# Patient Record
Sex: FEMALE | Race: BLACK OR AFRICAN AMERICAN | NOT HISPANIC OR LATINO | URBAN - METROPOLITAN AREA
[De-identification: names, ages, dates, MRNs, and addresses within clinical notes are randomized per-mention and may not be internally consistent; named-entity substitution may affect disease eponyms.]

---

## 2023-02-02 PROBLEM — E83.42 HYPOMAGNESEMIA: Status: ACTIVE | Noted: 2023-02-02

## 2023-02-02 PROBLEM — I35.0 MILD AORTIC STENOSIS: Status: ACTIVE | Noted: 2023-02-02

## 2023-02-02 PROBLEM — R92.8 MAMMOGRAM ABNORMAL: Status: ACTIVE | Noted: 2023-02-02

## 2023-02-02 PROBLEM — E55.9 VITAMIN D DEFICIENCY: Status: ACTIVE | Noted: 2023-02-02

## 2023-02-02 PROBLEM — G31.84 MCI (MILD COGNITIVE IMPAIRMENT): Status: ACTIVE | Noted: 2023-02-02

## 2023-02-02 PROBLEM — N95.8 OTHER SPECIFIED MENOPAUSAL AND PERIMENOPAUSAL DISORDERS: Status: ACTIVE | Noted: 2023-02-02

## 2023-02-02 PROBLEM — R53.1 WEAKNESS GENERALIZED: Status: ACTIVE | Noted: 2023-02-02

## 2023-02-02 PROBLEM — K21.9 GERD (GASTROESOPHAGEAL REFLUX DISEASE): Status: ACTIVE | Noted: 2023-02-02

## 2023-02-02 PROBLEM — R80.9 MICROALBUMINURIA: Status: ACTIVE | Noted: 2023-02-02

## 2023-02-02 PROBLEM — R09.89 BRUIT OF RIGHT CAROTID ARTERY: Status: ACTIVE | Noted: 2023-02-02

## 2023-02-02 PROBLEM — R79.89 LOW VITAMIN D LEVEL: Status: ACTIVE | Noted: 2023-02-02

## 2023-02-02 PROBLEM — M17.9 OSTEOARTHRITIS OF KNEE: Status: ACTIVE | Noted: 2023-02-02

## 2023-02-02 PROBLEM — E11.9 DIABETES MELLITUS, TYPE 2 (MULTI): Status: ACTIVE | Noted: 2023-02-02

## 2023-02-02 PROBLEM — F41.9 ANXIETY: Status: ACTIVE | Noted: 2023-02-02

## 2023-02-02 PROBLEM — I10 BENIGN ESSENTIAL HYPERTENSION: Status: ACTIVE | Noted: 2023-02-02

## 2023-02-02 PROBLEM — D24.9 BREAST FIBROADENOMA: Status: ACTIVE | Noted: 2023-02-02

## 2023-02-02 PROBLEM — M16.9 OSTEOARTHRITIS OF HIP: Status: ACTIVE | Noted: 2023-02-02

## 2023-02-02 PROBLEM — R30.0 DYSURIA: Status: ACTIVE | Noted: 2023-02-02

## 2023-02-02 PROBLEM — R35.0 FREQUENCY OF URINATION: Status: ACTIVE | Noted: 2023-02-02

## 2023-02-02 PROBLEM — M25.50 JOINT PAIN: Status: ACTIVE | Noted: 2023-02-02

## 2023-02-02 PROBLEM — E78.1 FAMILIAL HYPERTRIGLYCERIDEMIA: Status: ACTIVE | Noted: 2023-02-02

## 2023-02-02 RX ORDER — LANCETS
EACH MISCELLANEOUS DAILY
COMMUNITY
Start: 2017-04-28 | End: 2023-10-06 | Stop reason: ENTERED-IN-ERROR

## 2023-02-02 RX ORDER — ATORVASTATIN CALCIUM 20 MG/1
1 TABLET, FILM COATED ORAL NIGHTLY
COMMUNITY
Start: 2014-04-02 | End: 2023-05-22

## 2023-02-02 RX ORDER — FLUTICASONE PROPIONATE 50 MCG
1 SPRAY, SUSPENSION (ML) NASAL 2 TIMES DAILY
COMMUNITY
Start: 2021-02-01 | End: 2023-10-06 | Stop reason: ENTERED-IN-ERROR

## 2023-02-02 RX ORDER — HYDROCHLOROTHIAZIDE 25 MG/1
1 TABLET ORAL DAILY
COMMUNITY
Start: 2016-07-19 | End: 2023-03-16 | Stop reason: SINTOL

## 2023-02-02 RX ORDER — VALSARTAN 320 MG/1
1 TABLET ORAL DAILY
COMMUNITY
Start: 2022-01-18 | End: 2023-03-16 | Stop reason: ALTCHOICE

## 2023-02-02 RX ORDER — LORAZEPAM 1 MG/1
1 TABLET ORAL EVERY 12 HOURS PRN
COMMUNITY
End: 2023-03-16 | Stop reason: SDUPTHER

## 2023-02-02 RX ORDER — ESCITALOPRAM OXALATE 20 MG/1
20 TABLET ORAL DAILY
COMMUNITY
End: 2023-04-10

## 2023-02-02 RX ORDER — METFORMIN HYDROCHLORIDE 850 MG/1
1 TABLET ORAL
COMMUNITY
Start: 2014-09-09 | End: 2023-03-16 | Stop reason: DRUGHIGH

## 2023-02-02 RX ORDER — NIFEDIPINE 30 MG/1
1 TABLET, FILM COATED, EXTENDED RELEASE ORAL DAILY
COMMUNITY
Start: 2020-06-18 | End: 2023-04-14 | Stop reason: SDUPTHER

## 2023-02-02 RX ORDER — HYDROXYZINE HYDROCHLORIDE 25 MG/1
1 TABLET, FILM COATED ORAL NIGHTLY PRN
COMMUNITY
End: 2023-03-16 | Stop reason: ALTCHOICE

## 2023-02-02 RX ORDER — METOPROLOL SUCCINATE 100 MG/1
1 TABLET, EXTENDED RELEASE ORAL DAILY
COMMUNITY
Start: 2014-04-08 | End: 2023-05-21

## 2023-02-23 LAB
6-ACETYLMORPHINE: NORMAL
7-AMINOCLONAZEPAM: NORMAL
ALBUMIN (G/DL) IN SER/PLAS: 4.4 G/DL (ref 3.4–5)
ALPHA-HYDROXYALPRAZOLAM: NORMAL
ALPHA-HYDROXYMIDAZOLAM: NORMAL
ALPRAZOLAM: NORMAL
AMPHETAMINE (PRESENCE) IN URINE BY SCREEN METHOD: NORMAL
ANION GAP IN SER/PLAS: 17 MMOL/L (ref 10–20)
BARBITURATES PRESENCE IN URINE BY SCREEN METHOD: NORMAL
CALCIUM (MG/DL) IN SER/PLAS: 9.7 MG/DL (ref 8.6–10.6)
CANNABINOIDS IN URINE BY SCREEN METHOD: NORMAL
CARBON DIOXIDE, TOTAL (MMOL/L) IN SER/PLAS: 26 MMOL/L (ref 21–32)
CHLORDIAZEPOXIDE: NORMAL
CHLORIDE (MMOL/L) IN SER/PLAS: 99 MMOL/L (ref 98–107)
CLONAZEPAM: NORMAL
COCAINE (PRESENCE) IN URINE BY SCREEN METHOD: NORMAL
CODEINE: NORMAL
CREATINE, URINE FOR DRUG: NORMAL
CREATININE (MG/DL) IN SER/PLAS: 2.15 MG/DL (ref 0.5–1.05)
DIAZEPAM: NORMAL
DRUG SCREEN COMMENT URINE: NORMAL
EDDP: NORMAL
ERYTHROCYTE DISTRIBUTION WIDTH (RATIO) BY AUTOMATED COUNT: 16.3 % (ref 11.5–14.5)
ERYTHROCYTE MEAN CORPUSCULAR HEMOGLOBIN CONCENTRATION (G/DL) BY AUTOMATED: 31.2 G/DL (ref 32–36)
ERYTHROCYTE MEAN CORPUSCULAR VOLUME (FL) BY AUTOMATED COUNT: 84 FL (ref 80–100)
ERYTHROCYTES (10*6/UL) IN BLOOD BY AUTOMATED COUNT: 4.02 X10E12/L (ref 4–5.2)
FENTANYL CONFIRMATION, URINE: NORMAL
GFR FEMALE: 23 ML/MIN/1.73M2
GLUCOSE (MG/DL) IN SER/PLAS: 126 MG/DL (ref 74–99)
HEMATOCRIT (%) IN BLOOD BY AUTOMATED COUNT: 33.7 % (ref 36–46)
HEMOGLOBIN (G/DL) IN BLOOD: 10.5 G/DL (ref 12–16)
HYDROCODONE: NORMAL
HYDROMORPHONE: NORMAL
LEUKOCYTES (10*3/UL) IN BLOOD BY AUTOMATED COUNT: 6.7 X10E9/L (ref 4.4–11.3)
LORAZEPAM: NORMAL
METHADONE CONFIRMATION,URINE: NORMAL
MIDAZOLAM: NORMAL
MORPHINE URINE: NORMAL
N-DESMETHYLTRAMADOL-DATA CONVERSION: NORMAL
NORDIAZEPAM: NORMAL
NORFENTANYL: NORMAL
NORHYDROCODONE: NORMAL
NOROXYCODONE: NORMAL
NOROXYMORPHONE URINE: NORMAL
NRBC (PER 100 WBCS) BY AUTOMATED COUNT: 0 /100 WBC (ref 0–0)
O-DESMETHYLTRAMADOL: NORMAL
OXAZEPAM: NORMAL
OXYCODONE: NORMAL
OXYMORPHONE: NORMAL
PHENCYCLIDINE (PRESENCE) IN URINE BY SCREEN METHOD: NORMAL
PHOSPHATE (MG/DL) IN SER/PLAS: 4.7 MG/DL (ref 2.5–4.9)
PLATELETS (10*3/UL) IN BLOOD AUTOMATED COUNT: 207 X10E9/L (ref 150–450)
POTASSIUM (MMOL/L) IN SER/PLAS: 4.2 MMOL/L (ref 3.5–5.3)
PROTEIN TOTAL: 7.3 G/DL (ref 6.4–8.2)
SODIUM (MMOL/L) IN SER/PLAS: 138 MMOL/L (ref 136–145)
TEMAZEPAM: NORMAL
TRAMADOL: NORMAL
UREA NITROGEN (MG/DL) IN SER/PLAS: 37 MG/DL (ref 6–23)
ZOLPIDEM METABOLITE (ZCA): NORMAL
ZOLPIDEM: NORMAL

## 2023-02-24 LAB
IMMUNOGLOBULIN LIGHT CHAINS KAPPA/LAMBDA (MASS RATIO) IN SERUM: 0.03 (ref 0.26–1.65)
IMMUNOGLOBULIN LIGHT CHAINS.KAPPA (MG/DL) IN SERUM: 5.13 MG/DL (ref 0.33–1.94)
IMMUNOGLOBULIN LIGHT CHAINS.LAMBDA (MG/DL) IN SERUM: 182.79 MG/DL (ref 0.57–2.63)

## 2023-03-01 LAB
6-ACETYLMORPHINE: <25 NG/ML
7-AMINOCLONAZEPAM: <25 NG/ML
ALBUMIN ELP: 4 G/DL (ref 3.4–5)
ALPHA 1: 0.3 G/DL (ref 0.2–0.6)
ALPHA 2: 1 G/DL (ref 0.4–1.1)
ALPHA-HYDROXYALPRAZOLAM: <25 NG/ML
ALPHA-HYDROXYMIDAZOLAM: <25 NG/ML
ALPRAZOLAM: <25 NG/ML
AMPHETAMINE (PRESENCE) IN URINE BY SCREEN METHOD: ABNORMAL
BARBITURATES PRESENCE IN URINE BY SCREEN METHOD: ABNORMAL
BETA: 0.8 G/DL (ref 0.5–1.2)
CANNABINOIDS IN URINE BY SCREEN METHOD: ABNORMAL
CHLORDIAZEPOXIDE: <25 NG/ML
CLONAZEPAM: <25 NG/ML
COCAINE (PRESENCE) IN URINE BY SCREEN METHOD: ABNORMAL
CODEINE: <50 NG/ML
CREATINE, URINE FOR DRUG: 378.9 MG/DL
DIAZEPAM: <25 NG/ML
DRUG SCREEN COMMENT URINE: ABNORMAL
EDDP: <25 NG/ML
FENTANYL CONFIRMATION, URINE: <2.5 NG/ML
GAMMA GLOBULIN: 1.2 G/DL (ref 0.5–1.4)
HYDROCODONE: <25 NG/ML
HYDROMORPHONE: <25 NG/ML
LORAZEPAM: 134 NG/ML
M-PROTEIN 1: 0.1 G/DL
M-PROTEIN 2: 0.1 G/DL
M-PROTEIN 3: 0.7 G/DL
METHADONE CONFIRMATION,URINE: <25 NG/ML
MIDAZOLAM: <25 NG/ML
MORPHINE URINE: <50 NG/ML
NORDIAZEPAM: <25 NG/ML
NORFENTANYL: <2.5 NG/ML
NORHYDROCODONE: <25 NG/ML
NOROXYCODONE: <25 NG/ML
O-DESMETHYLTRAMADOL: <50 NG/ML
OXAZEPAM: <25 NG/ML
OXYCODONE: <25 NG/ML
OXYMORPHONE: <25 NG/ML
PATH REVIEW - SERUM IMMUNOFIXATION: NORMAL
PATH REVIEW-SERUM PROTEIN ELECTROPHORESIS: NORMAL
PHENCYCLIDINE (PRESENCE) IN URINE BY SCREEN METHOD: ABNORMAL
PROTEIN ELECTROPHORESIS INTERPRETATION: ABNORMAL
PROTEIN TOTAL: 7.3 G/DL (ref 6.4–8.2)
SERUM IMMUNOFIXATION INTERPRETATION: ABNORMAL
TEMAZEPAM: <25 NG/ML
TRAMADOL: <50 NG/ML
ZOLPIDEM METABOLITE (ZCA): <25 NG/ML
ZOLPIDEM: <25 NG/ML

## 2023-03-15 NOTE — PROGRESS NOTES
Subjective   Patient ID: Rolando Mays is a 82 y.o. female who presents for Follow-up.    HPI   OARRS:  Mena Menjivar MD on 3/16/2023 12:06 PM  I have personally reviewed the OARRS report for Rolando Mays. I have considered the risks of abuse, dependence, addiction and diversion    Is the patient prescribed a combination of a benzodiazepine and opioid?  No    Last Urine Drug Screen / ordered today: No  Recent Results (from the past 49102 hour(s))   OPIATE/OPIOID/BENZO PRESCRIPTION COMPLIANCE    Collection Time: 02/24/23  1:14 PM   Result Value Ref Range    DRUG SCREEN COMMENT URINE SEE BELOW     Creatine, Urine 378.9 mg/dL    Amphetamine Screen, Urine PRESUMPTIVE NEGATIVE NEGATIVE    Barbiturate Screen, Urine PRESUMPTIVE NEGATIVE NEGATIVE    Cannabinoid Screen, Urine PRESUMPTIVE NEGATIVE NEGATIVE    Cocaine Screen, Urine PRESUMPTIVE NEGATIVE NEGATIVE    PCP Screen, Urine PRESUMPTIVE NEGATIVE NEGATIVE    7-Aminoclonazepam <25 Cutoff <25 ng/mL    Alpha-Hydroxyalprazolam <25 Cutoff <25 ng/mL    Alpha-Hydroxymidazolam <25 Cutoff <25 ng/mL    Alprazolam <25 Cutoff <25 ng/mL    Chlordiazepoxide <25 Cutoff <25 ng/mL    Clonazepam <25 Cutoff <25 ng/mL    Diazepam <25 Cutoff <25 ng/mL    Lorazepam 134 (A) Cutoff <25 ng/mL    Midazolam <25 Cutoff <25 ng/mL    Nordiazepam <25 Cutoff <25 ng/mL    Oxazepam <25 Cutoff <25 ng/mL    Temazepam <25 Cutoff <25 ng/mL    Zolpidem <25 Cutoff <25 ng/mL    Zolpidem Metabolite (ZCA) <25 Cutoff <25 ng/mL    6-Acetylmorphine <25 Cutoff <25 ng/mL    Codeine <50 Cutoff <50 ng/mL    Hydrocodone <25 Cutoff <25 ng/mL    Hydromorphone <25 Cutoff <25 ng/mL    Morphine Urine <50 Cutoff <50 ng/mL    Norhydrocodone <25 Cutoff <25 ng/mL    Noroxycodone <25 Cutoff <25 ng/mL    Oxycodone <25 Cutoff <25 ng/mL    Oxymorphone <25 Cutoff <25 ng/mL    Tramadol <50 Cutoff <50 ng/mL    O-Desmethyltramadol <50 Cutoff <50 ng/mL    Fentanyl <2.5 Cutoff<2.5 ng/mL    Norfentanyl <2.5 Cutoff<2.5 ng/mL     METHADONE CONFIRMATION,URINE <25 Cutoff <25 ng/mL    EDDP <25 Cutoff <25 ng/mL     Results are as expected.     Controlled Substance Agreement:  Date of the Last Agreement: 03/16/2023  Reviewed Controlled Substance Agreement including but not limited to the benefits, risks, and alternatives to treatment with a Controlled Substance medication(s).    Benzodiazepines:  What is the patient's goal of therapy? Better control of anxiety  Is this being achieved with current treatment? es    AILYN-7:  No data recorded    Activities of Daily Living:   Is your overall impression that this patient is benefiting (symptom reduction outweighs side effects) from benzodiazepine therapy? Yes     1. Physical Functioning: Better  2. Family Relationship: Better  3. Social Relationship: Better  4. Mood: Better  5. Sleep Patterns: Better  6. Overall Function: Better    The ativan has been working out well for her anxiety and she reports that she has been feeling better at home. The patient had a biopsy done 1 day ago. She is also taking nifedipine, metoprolol for her hypertension and Lorazepam in addition to the lexapro for her anxiety. She reports no side effects from these medications.  We stopped er hydrochlorothiazide and Valsartan and BP have been stable. We stopped Metofmrin for RAMESH, sugars are in 200s.     Review of Systems  Constitutional: No fever or chills  Cardiovascular: no chest pain, no palpitations and no syncope.   Respiratory: no cough, no shortness of breath during exertion and no shortness of breath at rest.   Gastrointestinal: no abdominal pain, no nausea and no vomiting.  Neuro: No Headache, no dizziness    Objective   /61   Pulse 75   Wt 68.9 kg (152 lb)   SpO2 100%   BMI 26.09 kg/m²     Physical Exam  Constitutional: Alert and in no acute distress. Well developed, well nourished  Head and Face: Head and face: Normal.    Cardiovascular: Heart rate and rhythm were normal, normal S1 and S2. No peripheral edema.    Pulmonary: No respiratory distress. Clear bilateral breath sounds.  Musculoskeletal: Gait and station: Normal. Muscle strength/tone: Normal.   Skin: Normal skin color and pigmentation, normal skin turgor, and no rash.    Psychiatric: Judgment and insight: Intact. Mood and affect: Normal.    Lab Results   Component Value Date    WBC 6.7 03/15/2023    HGB 9.5 (L) 03/15/2023    HCT 30.2 (L) 03/15/2023     03/15/2023    CHOL 182 03/31/2022    TRIG 106 03/31/2022    HDL 62.1 03/31/2022    ALT 9 03/15/2023    AST 11 03/15/2023     03/15/2023    K 3.3 (L) 03/15/2023     03/15/2023    CREATININE 0.95 03/15/2023    BUN 22 03/15/2023    CO2 27 03/15/2023    TSH 0.47 03/31/2022    HGBA1C 6.2 (A) 11/30/2022       CT chest abdomen pelvis wo IV contrast  Narrative: Interpreted By:  JANAY OLIVA MD  MRN: [id number]  Patient Name: CORTES GRIJALVA     STUDY:  CT CHEST ABDOMEN PELVIS WO CONTRAST;  3/6/2023 1:14 pm     INDICATION:  r/o malignancy, anemia, wieght loss, and confusion  R63.4:  Unexplained weight loss.     COMPARISON:  CT abdomen 02/08/2016, CT chest 11/11/2015.     ACCESSION NUMBER(S):  [id number]     ORDERING CLINICIAN:  ERNA BORREGO     TECHNIQUE:  CT of the chest, abdomen and pelvis was performed. Contiguous axial  images were obtained at 3 mm slice thickness through the chest,  abdomen and pelvis. Coronal and sagittal reconstructions at 3 mm  slice thickness were performed.  No intravenous contrast was  administered; positive oral contrast was given.     FINDINGS:  Please note that the study is limited without intravenous contrast.     CHEST:  Examination is mildly degraded by motion.     LUNG/PLEURA/LARGE AIRWAYS:  Central airways are patent without endobronchial lesions. Mild  bibasilar atelectasis. No focal consolidation. No pneumothorax no  pleural effusion. No suspicious pulmonary nodule.     VESSELS:  Aorta and pulmonary arteries are normal caliber.  Mild  atherosclerotic changes are  noted of the aorta and branching vessels.  Mild coronary artery calcifications are present.     HEART:  The heart is normal in size.  No pericardial effusion     MEDIASTINUM AND LUNA:  No mediastinal, hilar or axillary lymph nodes are present.  There is  wall thickening of the esophagus.     CHEST WALL, BONES AND LOWER NECK:  Chest wall is unremarkable. Mild degenerative changes of the thoracic  spine. Partially imaged severe degenerative changes left shoulder. 9  mm right thyroid lobe low-attenuation lesion and partially imaged  low-attenuation lesion left thyroid lobe punctate calcification.     ABDOMEN:     LIVER:  The liver is normal in size without evidence of focal liver lesions.     BILE DUCTS:  The bile ducts are not dilated.     GALLBLADDER:  The gallbladder is not distended and with multiple layering calcified  stones.     PANCREAS:  The pancreas appears unremarkable.     SPLEEN:  Within normal limits.     ADRENAL GLANDS:  Bilateral adrenal glands appear normal.     KIDNEYS AND URETERS:  The kidneys have normal size and enhance symmetrically. 8.0 x 6.5 cm  right upper pole simple cyst, not significantly changed in size since  2016. Additional 7 mm left interpolar region hyperdense cyst (series  3, image 83), mildly increased in size since 2016. No  hydroureteronephrosis or nephroureterolithiasis is present.     PELVIS:     BLADDER:  The urinary bladder appears within normal limits.     REPRODUCTIVE ORGANS:  Densely calcified and enlarged uterine fibroid.     BOWEL:  The stomach is unremarkable.  No bowel dilation or wall thickening.  Extensive colonic stool with fecal impaction. Diverticulosis without  evidence of diverticulitis. The appendix appears normal.     VESSELS:  There is no aneurysmal dilatation of the abdominal aorta. The IVC is  within normal limits. Severe atherosclerotic calcification abdominal  aorta and iliac arteries.     PERITONEUM/RETROPERITONEUM/LYMPH NODES:  There is no free or  loculated fluid collection, no free  intraperitoneal air.  The retroperitoneum appears unremarkable.  No  abdominopelvic lymphadenopathy is present.     ABDOMINAL WALL:  The abdominal wall soft tissues appear normal.     BONES:  No suspicious osseous lesions are present. Degenerative discogenic  disease is noted in the lower thoracic and lumbar spine. Grade 1  anterolisthesis L3 on L4 and L4 on L5. Mild levoscoliosis centered in  the upper lumbar spine.     Impression: Chest  1.  No acute findings in the thorax.  2. No suspicious pulmonary nodule or thoracic adenopathy.  3. 9 mm right thyroid lobe low-attenuation nodule.     Abdomen-Pelvis  1.  No acute findings in the abdomen and pelvis.  2. Cholelithiasis without evidence of cholecystitis.  3. Extensive colonic stool with fecal impaction.  4. Diverticulosis without diverticulitis.  5. Stable right upper pole renal simple cyst and mild interval  increased 7 mm left interpolar region hyperdense cysts, since 2016.         Assessment/Plan   Diagnoses and all orders for this visit:  Type 2 diabetes mellitus without complication, without long-term current use of insulin (CMS/[facility])  -     metFORMIN (Glucophage) 500 mg tablet; Take 1 tablet (500 mg) by mouth once daily with a meal.  -     Hemoglobin A1C; Future  -     Follow Up In [facility] - PCP; Future  Benign essential hypertension  -     Comprehensive Metabolic Panel; Future  MCI (mild cognitive impairment)  Monoclonal gammopathy of unknown significance (MGUS)        Dear Rolando Mays     It was my pleasure to take care of you today in the office. Below are the things we discussed today:    1. Hypertension: Advised patient to sop hydrochlorothiazide. Continue nifedipine and metoprolol.    2. Diabetes: Restart Metformin. If blood sugars are consistently elevated we will increase the dose.    3. Anxiety: Continue adderall and Lorazepam.    4. Advised the patient to incorporate more vegetables in her  diet.    5. Hyperlipidemia: Continue atorvastatin.    6. MGUS: The patient will follow up with hematology.     Your yearly Physical is due in: May 2023   When you call the office for your yearly Physical, please ask them to inform me to order your blood work, so that you can get the fasting blood work before your appointment and we can discuss the results at your physical.      Please call me if any questions arise from now until your next visit. I will call you after I am done seeing patients. A Doctor is always available by phone when the office is closed. Please feel free to call for help with any problem that you feel shouldn't wait until the office re-opens.     Scribe Attestation  By signing my name below, IKayla Scribe   attest that this documentation has been prepared under the direction and in the presence of Mena Menjivar MD.

## 2023-03-16 ENCOUNTER — OFFICE VISIT (OUTPATIENT)
Dept: PRIMARY CARE | Facility: CLINIC | Age: 83
End: 2023-03-16
Payer: COMMERCIAL

## 2023-03-16 VITALS
OXYGEN SATURATION: 100 % | HEART RATE: 75 BPM | SYSTOLIC BLOOD PRESSURE: 125 MMHG | BODY MASS INDEX: 26.09 KG/M2 | WEIGHT: 152 LBS | DIASTOLIC BLOOD PRESSURE: 61 MMHG

## 2023-03-16 DIAGNOSIS — G31.84 MCI (MILD COGNITIVE IMPAIRMENT): ICD-10-CM

## 2023-03-16 DIAGNOSIS — D47.2 MONOCLONAL GAMMOPATHY OF UNKNOWN SIGNIFICANCE (MGUS): ICD-10-CM

## 2023-03-16 DIAGNOSIS — E11.9 TYPE 2 DIABETES MELLITUS WITHOUT COMPLICATION, WITHOUT LONG-TERM CURRENT USE OF INSULIN (MULTI): Primary | ICD-10-CM

## 2023-03-16 DIAGNOSIS — I10 BENIGN ESSENTIAL HYPERTENSION: ICD-10-CM

## 2023-03-16 DIAGNOSIS — F41.9 ANXIETY: ICD-10-CM

## 2023-03-16 PROBLEM — R30.0 DYSURIA: Status: RESOLVED | Noted: 2023-02-02 | Resolved: 2023-03-16

## 2023-03-16 PROCEDURE — 3078F DIAST BP <80 MM HG: CPT | Performed by: FAMILY MEDICINE

## 2023-03-16 PROCEDURE — 99214 OFFICE O/P EST MOD 30 MIN: CPT | Performed by: FAMILY MEDICINE

## 2023-03-16 PROCEDURE — 1160F RVW MEDS BY RX/DR IN RCRD: CPT | Performed by: FAMILY MEDICINE

## 2023-03-16 PROCEDURE — 1159F MED LIST DOCD IN RCRD: CPT | Performed by: FAMILY MEDICINE

## 2023-03-16 PROCEDURE — 3074F SYST BP LT 130 MM HG: CPT | Performed by: FAMILY MEDICINE

## 2023-03-16 PROCEDURE — 1036F TOBACCO NON-USER: CPT | Performed by: FAMILY MEDICINE

## 2023-03-16 RX ORDER — LORAZEPAM 1 MG/1
0.5 TABLET ORAL 2 TIMES DAILY PRN
Qty: 60 TABLET | Refills: 1 | Status: SHIPPED | OUTPATIENT
Start: 2023-03-16 | End: 2023-06-19 | Stop reason: SDUPTHER

## 2023-03-16 RX ORDER — METFORMIN HYDROCHLORIDE 500 MG/1
500 TABLET ORAL
Qty: 90 TABLET | Refills: 0 | Status: SHIPPED | OUTPATIENT
Start: 2023-03-16 | End: 2023-06-12

## 2023-03-16 ASSESSMENT — ENCOUNTER SYMPTOMS
LOSS OF SENSATION IN FEET: 0
DEPRESSION: 0
OCCASIONAL FEELINGS OF UNSTEADINESS: 1

## 2023-03-16 ASSESSMENT — PATIENT HEALTH QUESTIONNAIRE - PHQ9
1. LITTLE INTEREST OR PLEASURE IN DOING THINGS: NOT AT ALL
2. FEELING DOWN, DEPRESSED OR HOPELESS: NOT AT ALL
SUM OF ALL RESPONSES TO PHQ9 QUESTIONS 1 AND 2: 0

## 2023-04-09 DIAGNOSIS — F41.9 ANXIETY DISORDER, UNSPECIFIED: ICD-10-CM

## 2023-04-10 RX ORDER — ESCITALOPRAM OXALATE 20 MG/1
TABLET ORAL
Qty: 90 TABLET | Refills: 0 | Status: SHIPPED | OUTPATIENT
Start: 2023-04-10 | End: 2023-07-01

## 2023-04-14 ENCOUNTER — TELEPHONE (OUTPATIENT)
Dept: PRIMARY CARE | Facility: CLINIC | Age: 83
End: 2023-04-14
Payer: COMMERCIAL

## 2023-04-14 DIAGNOSIS — I10 BENIGN ESSENTIAL HYPERTENSION: Primary | ICD-10-CM

## 2023-04-14 LAB
ALANINE AMINOTRANSFERASE (SGPT) (U/L) IN SER/PLAS: 10 U/L (ref 7–45)
ALBUMIN (G/DL) IN SER/PLAS: 3.8 G/DL (ref 3.4–5)
ALKALINE PHOSPHATASE (U/L) IN SER/PLAS: 68 U/L (ref 33–136)
ANION GAP IN SER/PLAS: 11 MMOL/L (ref 10–20)
ASPARTATE AMINOTRANSFERASE (SGOT) (U/L) IN SER/PLAS: 14 U/L (ref 9–39)
BASOPHILS (10*3/UL) IN BLOOD BY AUTOMATED COUNT: 0.02 X10E9/L (ref 0–0.1)
BASOPHILS/100 LEUKOCYTES IN BLOOD BY AUTOMATED COUNT: 0.4 % (ref 0–2)
BILIRUBIN TOTAL (MG/DL) IN SER/PLAS: 1.7 MG/DL (ref 0–1.2)
CALCIUM (MG/DL) IN SER/PLAS: 9 MG/DL (ref 8.6–10.6)
CARBON DIOXIDE, TOTAL (MMOL/L) IN SER/PLAS: 29 MMOL/L (ref 21–32)
CHLORIDE (MMOL/L) IN SER/PLAS: 105 MMOL/L (ref 98–107)
CREATININE (MG/DL) IN SER/PLAS: 0.78 MG/DL (ref 0.5–1.05)
EOSINOPHILS (10*3/UL) IN BLOOD BY AUTOMATED COUNT: 0.09 X10E9/L (ref 0–0.4)
EOSINOPHILS/100 LEUKOCYTES IN BLOOD BY AUTOMATED COUNT: 1.9 % (ref 0–6)
ERYTHROCYTE DISTRIBUTION WIDTH (RATIO) BY AUTOMATED COUNT: 17.2 % (ref 11.5–14.5)
ERYTHROCYTE MEAN CORPUSCULAR HEMOGLOBIN CONCENTRATION (G/DL) BY AUTOMATED: 29.7 G/DL (ref 32–36)
ERYTHROCYTE MEAN CORPUSCULAR VOLUME (FL) BY AUTOMATED COUNT: 90 FL (ref 80–100)
ERYTHROCYTES (10*6/UL) IN BLOOD BY AUTOMATED COUNT: 3.22 X10E12/L (ref 4–5.2)
GFR FEMALE: 76 ML/MIN/1.73M2
GLUCOSE (MG/DL) IN SER/PLAS: 116 MG/DL (ref 74–99)
HEMATOCRIT (%) IN BLOOD BY AUTOMATED COUNT: 29 % (ref 36–46)
HEMOGLOBIN (G/DL) IN BLOOD: 8.6 G/DL (ref 12–16)
IMMATURE GRANULOCYTES/100 LEUKOCYTES IN BLOOD BY AUTOMATED COUNT: 1 % (ref 0–0.9)
LEUKOCYTES (10*3/UL) IN BLOOD BY AUTOMATED COUNT: 4.8 X10E9/L (ref 4.4–11.3)
LYMPHOCYTES (10*3/UL) IN BLOOD BY AUTOMATED COUNT: 1.49 X10E9/L (ref 0.8–3)
LYMPHOCYTES/100 LEUKOCYTES IN BLOOD BY AUTOMATED COUNT: 31 % (ref 13–44)
MONOCYTES (10*3/UL) IN BLOOD BY AUTOMATED COUNT: 0.37 X10E9/L (ref 0.05–0.8)
MONOCYTES/100 LEUKOCYTES IN BLOOD BY AUTOMATED COUNT: 7.7 % (ref 2–10)
NEUTROPHILS (10*3/UL) IN BLOOD BY AUTOMATED COUNT: 2.79 X10E9/L (ref 1.6–5.5)
NEUTROPHILS/100 LEUKOCYTES IN BLOOD BY AUTOMATED COUNT: 58 % (ref 40–80)
NRBC (PER 100 WBCS) BY AUTOMATED COUNT: 0.4 /100 WBC (ref 0–0)
PLATELETS (10*3/UL) IN BLOOD AUTOMATED COUNT: 206 X10E9/L (ref 150–450)
POTASSIUM (MMOL/L) IN SER/PLAS: 4 MMOL/L (ref 3.5–5.3)
PROTEIN TOTAL: 6.3 G/DL (ref 6.4–8.2)
SODIUM (MMOL/L) IN SER/PLAS: 141 MMOL/L (ref 136–145)
UREA NITROGEN (MG/DL) IN SER/PLAS: 11 MG/DL (ref 6–23)

## 2023-04-14 RX ORDER — NIFEDIPINE 30 MG/1
TABLET, FILM COATED, EXTENDED RELEASE ORAL
Qty: 180 TABLET | Refills: 0 | Status: SHIPPED | OUTPATIENT
Start: 2023-04-14 | End: 2023-07-06

## 2023-05-11 ENCOUNTER — LAB (OUTPATIENT)
Dept: LAB | Facility: LAB | Age: 83
End: 2023-05-11
Payer: COMMERCIAL

## 2023-05-11 DIAGNOSIS — I10 BENIGN ESSENTIAL HYPERTENSION: ICD-10-CM

## 2023-05-11 DIAGNOSIS — E11.9 TYPE 2 DIABETES MELLITUS WITHOUT COMPLICATION, WITHOUT LONG-TERM CURRENT USE OF INSULIN (MULTI): ICD-10-CM

## 2023-05-11 LAB
ALANINE AMINOTRANSFERASE (SGPT) (U/L) IN SER/PLAS: 12 U/L (ref 7–45)
ALBUMIN (G/DL) IN SER/PLAS: 3.8 G/DL (ref 3.4–5)
ALKALINE PHOSPHATASE (U/L) IN SER/PLAS: 76 U/L (ref 33–136)
ANION GAP IN SER/PLAS: 12 MMOL/L (ref 10–20)
ASPARTATE AMINOTRANSFERASE (SGOT) (U/L) IN SER/PLAS: 11 U/L (ref 9–39)
BILIRUBIN TOTAL (MG/DL) IN SER/PLAS: 1.2 MG/DL (ref 0–1.2)
CALCIUM (MG/DL) IN SER/PLAS: 9.4 MG/DL (ref 8.6–10.6)
CARBON DIOXIDE, TOTAL (MMOL/L) IN SER/PLAS: 30 MMOL/L (ref 21–32)
CHLORIDE (MMOL/L) IN SER/PLAS: 106 MMOL/L (ref 98–107)
CREATININE (MG/DL) IN SER/PLAS: 0.8 MG/DL (ref 0.5–1.05)
ESTIMATED AVERAGE GLUCOSE FOR HBA1C: 123 MG/DL
GFR FEMALE: 74 ML/MIN/1.73M2
GLUCOSE (MG/DL) IN SER/PLAS: 132 MG/DL (ref 74–99)
HEMOGLOBIN A1C/HEMOGLOBIN TOTAL IN BLOOD: 5.9 %
POTASSIUM (MMOL/L) IN SER/PLAS: 4.3 MMOL/L (ref 3.5–5.3)
PROTEIN TOTAL: 6.7 G/DL (ref 6.4–8.2)
SODIUM (MMOL/L) IN SER/PLAS: 144 MMOL/L (ref 136–145)
UREA NITROGEN (MG/DL) IN SER/PLAS: 19 MG/DL (ref 6–23)

## 2023-05-11 PROCEDURE — 36415 COLL VENOUS BLD VENIPUNCTURE: CPT

## 2023-05-11 PROCEDURE — 80053 COMPREHEN METABOLIC PANEL: CPT

## 2023-05-11 PROCEDURE — 83036 HEMOGLOBIN GLYCOSYLATED A1C: CPT

## 2023-05-17 ENCOUNTER — OFFICE VISIT (OUTPATIENT)
Dept: PRIMARY CARE | Facility: CLINIC | Age: 83
End: 2023-05-17
Payer: COMMERCIAL

## 2023-05-17 VITALS
HEIGHT: 64 IN | DIASTOLIC BLOOD PRESSURE: 81 MMHG | OXYGEN SATURATION: 98 % | HEART RATE: 56 BPM | BODY MASS INDEX: 26.98 KG/M2 | WEIGHT: 158 LBS | SYSTOLIC BLOOD PRESSURE: 138 MMHG

## 2023-05-17 DIAGNOSIS — I10 BENIGN ESSENTIAL HYPERTENSION: Primary | ICD-10-CM

## 2023-05-17 DIAGNOSIS — D47.2 MONOCLONAL GAMMOPATHY OF UNKNOWN SIGNIFICANCE (MGUS): ICD-10-CM

## 2023-05-17 DIAGNOSIS — G31.84 MCI (MILD COGNITIVE IMPAIRMENT): ICD-10-CM

## 2023-05-17 DIAGNOSIS — E55.9 VITAMIN D DEFICIENCY: ICD-10-CM

## 2023-05-17 DIAGNOSIS — E11.9 TYPE 2 DIABETES MELLITUS WITHOUT COMPLICATION, WITHOUT LONG-TERM CURRENT USE OF INSULIN (MULTI): ICD-10-CM

## 2023-05-17 PROCEDURE — 1036F TOBACCO NON-USER: CPT | Performed by: FAMILY MEDICINE

## 2023-05-17 PROCEDURE — 1160F RVW MEDS BY RX/DR IN RCRD: CPT | Performed by: FAMILY MEDICINE

## 2023-05-17 PROCEDURE — 1159F MED LIST DOCD IN RCRD: CPT | Performed by: FAMILY MEDICINE

## 2023-05-17 PROCEDURE — 99214 OFFICE O/P EST MOD 30 MIN: CPT | Performed by: FAMILY MEDICINE

## 2023-05-17 PROCEDURE — 3075F SYST BP GE 130 - 139MM HG: CPT | Performed by: FAMILY MEDICINE

## 2023-05-17 PROCEDURE — 3079F DIAST BP 80-89 MM HG: CPT | Performed by: FAMILY MEDICINE

## 2023-05-17 ASSESSMENT — ENCOUNTER SYMPTOMS
SHORTNESS OF BREATH: 0
OCCASIONAL FEELINGS OF UNSTEADINESS: 1
VOMITING: 0
MYALGIAS: 0
CONSTIPATION: 0
FEVER: 0
UNEXPECTED WEIGHT CHANGE: 0
DIARRHEA: 0
LOSS OF SENSATION IN FEET: 0
NAUSEA: 0
CHILLS: 0
DEPRESSION: 1

## 2023-05-17 NOTE — PROGRESS NOTES
Subjective   Patient ID: Rolando Mays is a 82 y.o. female who presents for a follow up for hypertension.    HPI   The patient states that she is taking atorvastatin for her hyperlipidemia and Metformin for her diabetes. She is taking these medications as prescribed and is tolerating it well. Her diabetes is well- controlled at this time. She adds that the ativan and Lexapro has been working well for her anxiety and depression and her confusion has not worsened or improved. The patient reports that she is following up with Hematology for her MGUS and was recently started on Dexamethasone. Additionally, she reports that she has been taking Metoprolol daily and nifedipine as needed for hypertension and her home blood pressures have been in the 130s systolic and 80s diastolic.     OARRS:  Mena Menjivar MD on 5/17/2023  2:34 PM  I have personally reviewed the OARRS report for Rolando Mays. I have considered the risks of abuse, dependence, addiction and diversion    Is the patient prescribed a combination of a benzodiazepine and opioid?  No    Last Urine Drug Screen / ordered today: No  Recent Results (from the past 97779 hour(s))   OPIATE/OPIOID/BENZO PRESCRIPTION COMPLIANCE    Collection Time: 02/24/23  1:14 PM   Result Value Ref Range    DRUG SCREEN COMMENT URINE SEE BELOW     Creatine, Urine 378.9 mg/dL    Amphetamine Screen, Urine PRESUMPTIVE NEGATIVE NEGATIVE    Barbiturate Screen, Urine PRESUMPTIVE NEGATIVE NEGATIVE    Cannabinoid Screen, Urine PRESUMPTIVE NEGATIVE NEGATIVE    Cocaine Screen, Urine PRESUMPTIVE NEGATIVE NEGATIVE    PCP Screen, Urine PRESUMPTIVE NEGATIVE NEGATIVE    7-Aminoclonazepam <25 Cutoff <25 ng/mL    Alpha-Hydroxyalprazolam <25 Cutoff <25 ng/mL    Alpha-Hydroxymidazolam <25 Cutoff <25 ng/mL    Alprazolam <25 Cutoff <25 ng/mL    Chlordiazepoxide <25 Cutoff <25 ng/mL    Clonazepam <25 Cutoff <25 ng/mL    Diazepam <25 Cutoff <25 ng/mL    Lorazepam 134 (A) Cutoff <25 ng/mL    Midazolam  "<25 Cutoff <25 ng/mL    Nordiazepam <25 Cutoff <25 ng/mL    Oxazepam <25 Cutoff <25 ng/mL    Temazepam <25 Cutoff <25 ng/mL    Zolpidem <25 Cutoff <25 ng/mL    Zolpidem Metabolite (ZCA) <25 Cutoff <25 ng/mL    6-Acetylmorphine <25 Cutoff <25 ng/mL    Codeine <50 Cutoff <50 ng/mL    Hydrocodone <25 Cutoff <25 ng/mL    Hydromorphone <25 Cutoff <25 ng/mL    Morphine Urine <50 Cutoff <50 ng/mL    Norhydrocodone <25 Cutoff <25 ng/mL    Noroxycodone <25 Cutoff <25 ng/mL    Oxycodone <25 Cutoff <25 ng/mL    Oxymorphone <25 Cutoff <25 ng/mL    Tramadol <50 Cutoff <50 ng/mL    O-Desmethyltramadol <50 Cutoff <50 ng/mL    Fentanyl <2.5 Cutoff<2.5 ng/mL    Norfentanyl <2.5 Cutoff<2.5 ng/mL    METHADONE CONFIRMATION,URINE <25 Cutoff <25 ng/mL    EDDP <25 Cutoff <25 ng/mL     Results are as expected.     Controlled Substance Agreement:  Date of the Last Agreement: 03/16/2023  Reviewed Controlled Substance Agreement including but not limited to the benefits, risks, and alternatives to treatment with a Controlled Substance medication(s).    Benzodiazepines:  What is the patient's goal of therapy? Better mood  Is this being achieved with current treatment? Yes    AILYN-7:  No data recorded    Activities of Daily Living:   Is your overall impression that this patient is benefiting (symptom reduction outweighs side effects) from benzodiazepine therapy? Yes     1. Physical Functioning: Better  2. Family Relationship: Better  3. Social Relationship: Better  4. Mood: Better  5. Sleep Patterns: Better  6. Overall Function: Better    Review of Systems  Constitutional: No fever or chills  Cardiovascular: no chest pain, no palpitations and no syncope.   Respiratory: no cough, no shortness of breath during exertion and no shortness of breath at rest.   Gastrointestinal: no abdominal pain, no nausea and no vomiting.  Neuro: No Headache, no dizziness    Objective   /81   Pulse 56   Ht 1.626 m (5' 4\")   Wt 71.7 kg (158 lb)   SpO2 98%   " BMI 27.12 kg/m²     Physical Exam  Constitutional: Alert and in no acute distress. Well developed, well nourished  Head and Face: Head and face: Normal.    Cardiovascular: Heart rate and rhythm were normal, normal S1 and S2. No peripheral edema.   Pulmonary: No respiratory distress. Clear bilateral breath sounds.  Musculoskeletal: Gait and station: Normal. Muscle strength/tone: Normal.   Skin: Normal skin color and pigmentation, normal skin turgor, and no rash.    Psychiatric: Judgment and insight: Intact. Mood and affect: Normal.    Lab Results   Component Value Date    WBC 7.5 05/04/2023    HGB 9.9 (L) 05/04/2023    HCT 31.8 (L) 05/04/2023     05/04/2023    CHOL 182 03/31/2022    TRIG 106 03/31/2022    HDL 62.1 03/31/2022    ALT 12 05/11/2023    AST 11 05/11/2023     05/11/2023    K 4.3 05/11/2023     05/11/2023    CREATININE 0.80 05/11/2023    BUN 19 05/11/2023    CO2 30 05/11/2023    TSH 0.47 03/31/2022    HGBA1C 5.9 (A) 05/11/2023       NM PET CT myeloma initial  Narrative: Interpreted By:  SALVADOR LAND MD and CARLOS MANUEL BOYER MD  MRN: [id number]  Patient Name: CORTES GRIJALVA     STUDY:  PET/CT MYELOMA INITIAL;  4/10/2023 10:00 am     INDICATION:  myeloma staging  D47.2: MGUS (monoclonal gammopathy of unknown  significance).     COMPARISON:  CT chest abdomen pelvis 03/06/2023     ACCESSION NUMBER(S):  [id number]     ORDERING CLINICIAN:  LORI CHAVIS     TECHNIQUE:  DIVISION OF NUCLEAR MEDICINE  POSITRON EMISSION TOMOGRAPHY (PET-CT)     The patient received an intravenous dose of 11.4 mCi of Fluorine-18  fluorodeoxyglucose (FDG). Positron emission tomographic (PET) images  from mid-thigh to skull base were then acquired after a one hour  delay. Also acquired was a contemporaneous low dose non-contrast CT  scan performed for attenuation correction of PET images and anatomic  localization.  The PET and CT images were digitally fused for  display.  All images were acquired on a combined  PET-CT scanner unit.  Some areas of FDG accumulation may be described in standardized  uptake value (SUV) units.     CODING:  Initial Treatment Strategy (PI)     CALIBRATION:  Dose Injection-to-Scan Interval (mins): 61 min  Mediastinal bloodpool SUV (normal 1.5-2.5): 2.9  Blood glucose: 137 mg/dL     FINDINGS:  NECK:  Nonspecific hypermetabolic foci in the thyroid isthmus (max SUV 3.9)  and the left posterior thyroid lobe (max SUV 3.4).  No hypermetabolic cervical lymphadenopathy is present.     CHEST:  No focal hypermetabolic lesion is seen in the lung parenchyma.  No evidence of hypermetabolic mediastinal, hilar or axillary  lymphadenopathy.     ABDOMEN AND PELVIS:  No abnormal hypermetabolic activity in the abdomen and pelvis.  No evidence of hypermetabolic lymphadenopathy.  Physiologic radiotracer uptake is present in the liver and spleen  with excretion into the bowel loops and the genitourinary tract.  Cholelithiasis is noted.  The uterus is enlarged with multiple  partially calcified uterine fibroids. There is an 8.0 cm photopenic  defect, compatible with a simple renal cyst arising from the right  kidney.     MUSCULOSKELETAL:  There is no focal hypermetabolic lesion to suggest osseous metastasis.  Degenerative changes are noted in the bilateral shoulders and the  left knee.     Impression: No definite evidence of an active myelomatous process.     Hypermetabolic foci in the thyroid isthmus and posterior thyroid  lobe. It should be noted that incidental foci within the thyroid may  be bowed in of the 30% of cases. Further evaluation is recommended  with dedicated thyroid ultrasound.     I personally reviewed the image(s) / study and agree with the  findings and interpretation as stated. This study was interpreted at  [facility].         Assessment/Plan   Diagnoses and all orders for this visit:  Benign essential hypertension  Type 2 diabetes mellitus without complication,  without long-term current use of insulin (CMS/[facility])  -     Follow Up In [facility] - PCP  MCI (mild cognitive impairment)  -     MR brain wo IV contrast neuroquant protocol; Future  -     Follow Up In [facility] - PCP; Future  Monoclonal gammopathy of unknown significance (MGUS)  -     MR brain wo IV contrast neuroquant protocol; Future  -     Follow Up In [facility] - PCP; Future  Vitamin D deficiency        Dear Rolando Mays     It was my pleasure to take care of you today in the office. Below are the things we discussed today:    1. Hyperlipidemia: Continue atorvastatin.    2. Diabetes: A1c 5.9. Well-controlled. Continue Metformin.    3. Hypertension: Continue metoprolol and use Nifedipine as needed.    4. Depression, anxiety and cognitive impairment: Continue Lexapro and use Ativan as needed. Brain MRI ordered.  I have personally reviewed the OARRS report for this patient. This report is scanned into the electronic medical record. I have considered the risks of abuse, dependence, addiction and diversion. I believe that it is clinically appropriate for this patient to be prescribed this medication.     Based on the above findings and the clinical response to the controlled substance medications and improvement of the activities of daily living, sleep, and work performance. We made this complex decision to continue this therapy in light of the evidence of the patient's responsibility in using these medications as prescribed for their condition.     I explained and discussed with the patient and stressed the importance of knowing the side effects and the addicting and habit forming nature of the dangerous drugs we are using to treat the symptoms.     5. MGUS: Continue following up with Hematology. Continue taking Dexamethasone.    Your yearly Physical is due in: August 2023  When you call the office for your yearly Physical, please ask them to inform me to order your blood work,  so that you can get the fasting blood work before your appointment and we can discuss the results at your physical.      Please call me if any questions arise from now until your next visit. I will call you after I am done seeing patients. A Doctor is always available by phone when the office is closed. Please feel free to call for help with any problem that you feel shouldn't wait until the office re-opens.     Scribe Attestation  By signing my name below, IKayla Scribe   attest that this documentation has been prepared under the direction and in the presence of Mena Menjivar MD.

## 2023-05-17 NOTE — PROGRESS NOTES
"Subjective   Patient ID: Rolando Mays is a 82 y.o. female who presents for 2 month followup.    Patient presents to clinic with her caregiver today. They state that the patient has been in overall good health since her last visit. Her MGUS is being managed by her hematologist, who recently prescribed Decadron for management; patient discontinued nifedipine at that time and has had stable blood pressures in the 130s/80s. She is stable on the lorazepam twice daily and her caregiver notes she is much less agitated. Patient says she is eating a balanced diet. They would like refills for most of her medications today.          Review of Systems   Constitutional:  Negative for chills, fever and unexpected weight change.   Respiratory:  Negative for shortness of breath.    Cardiovascular:  Negative for chest pain.   Gastrointestinal:  Negative for constipation, diarrhea, nausea and vomiting.   Musculoskeletal:  Negative for myalgias.       Objective   /81   Pulse 56   Ht 1.626 m (5' 4\")   Wt 71.7 kg (158 lb)   SpO2 98%   BMI 27.12 kg/m²     Physical Exam  Constitutional:       Appearance: Normal appearance.   HENT:      Head: Normocephalic and atraumatic.   Eyes:      Extraocular Movements: Extraocular movements intact.      Pupils: Pupils are equal, round, and reactive to light.   Cardiovascular:      Rate and Rhythm: Normal rate and regular rhythm.      Heart sounds: No murmur heard.     No friction rub. No gallop.   Pulmonary:      Effort: Pulmonary effort is normal.      Breath sounds: Normal breath sounds. No wheezing, rhonchi or rales.   Neurological:      Mental Status: She is alert and oriented to person, place, and time. Mental status is at baseline.   Psychiatric:         Mood and Affect: Mood normal.         Behavior: Behavior normal.         Assessment/Plan   Problem List Items Addressed This Visit          Nervous    MCI (mild cognitive impairment)    Relevant Orders    MR brain wo IV contrast " neuroquant protocol    Follow Up In [facility] - PCP       Circulatory    Benign essential hypertension - Primary       Endocrine/Metabolic    Diabetes mellitus, type 2 (CMS/HCC)    Vitamin D deficiency       Immune    Monoclonal gammopathy of unknown significance (MGUS)    Relevant Orders    MR brain wo IV contrast neuroquant protocol    Follow Up In [facility] - PCP     HTN - continue metoprolol; ok to discontinue nifedipine as patient has been stable without. If blood pressures worsen call office for further management.   DM - continue metformin and healthy diet. HbA1C on 5/11 improved to 5.9, which may partly be due to overall anemia but still represents improvement of glycemic control.  Anxiety - continue Ativan BID.  HLD - continue atorvastatin.  MGUS - continue regular follow up with hematology.    Return to clinic in 3 months for follow up.

## 2023-05-19 DIAGNOSIS — I10 ESSENTIAL (PRIMARY) HYPERTENSION: ICD-10-CM

## 2023-05-20 DIAGNOSIS — E78.1 PURE HYPERGLYCERIDEMIA: ICD-10-CM

## 2023-05-21 RX ORDER — METOPROLOL SUCCINATE 100 MG/1
TABLET, EXTENDED RELEASE ORAL
Qty: 90 TABLET | Refills: 0 | Status: SHIPPED | OUTPATIENT
Start: 2023-05-21 | End: 2023-08-17

## 2023-05-22 RX ORDER — ATORVASTATIN CALCIUM 20 MG/1
20 TABLET, FILM COATED ORAL NIGHTLY
Qty: 90 TABLET | Refills: 0 | Status: SHIPPED | OUTPATIENT
Start: 2023-05-22 | End: 2023-08-17

## 2023-06-12 DIAGNOSIS — E11.9 TYPE 2 DIABETES MELLITUS WITHOUT COMPLICATION, WITHOUT LONG-TERM CURRENT USE OF INSULIN (MULTI): ICD-10-CM

## 2023-06-12 RX ORDER — METFORMIN HYDROCHLORIDE 500 MG/1
500 TABLET ORAL
Qty: 90 TABLET | Refills: 0 | Status: SHIPPED | OUTPATIENT
Start: 2023-06-12 | End: 2023-09-14

## 2023-06-19 DIAGNOSIS — F41.9 ANXIETY: ICD-10-CM

## 2023-06-19 RX ORDER — LORAZEPAM 1 MG/1
0.5 TABLET ORAL 2 TIMES DAILY PRN
Qty: 60 TABLET | Refills: 1 | Status: SHIPPED | OUTPATIENT
Start: 2023-06-19 | End: 2023-09-13 | Stop reason: SDUPTHER

## 2023-07-06 DIAGNOSIS — I10 BENIGN ESSENTIAL HYPERTENSION: ICD-10-CM

## 2023-07-06 RX ORDER — NIFEDIPINE 30 MG/1
TABLET, FILM COATED, EXTENDED RELEASE ORAL
Qty: 180 TABLET | Refills: 0 | Status: SHIPPED | OUTPATIENT
Start: 2023-07-06 | End: 2023-10-09

## 2023-08-03 DIAGNOSIS — I10 ESSENTIAL (PRIMARY) HYPERTENSION: ICD-10-CM

## 2023-08-04 RX ORDER — HYDROCHLOROTHIAZIDE 25 MG/1
25 TABLET ORAL DAILY
Qty: 30 TABLET | Refills: 0 | Status: SHIPPED | OUTPATIENT
Start: 2023-08-04 | End: 2023-09-06

## 2023-08-17 DIAGNOSIS — E78.1 PURE HYPERGLYCERIDEMIA: ICD-10-CM

## 2023-08-17 DIAGNOSIS — I10 ESSENTIAL (PRIMARY) HYPERTENSION: ICD-10-CM

## 2023-08-17 RX ORDER — METOPROLOL SUCCINATE 100 MG/1
TABLET, EXTENDED RELEASE ORAL
Qty: 90 TABLET | Refills: 0 | Status: SHIPPED | OUTPATIENT
Start: 2023-08-17

## 2023-08-17 RX ORDER — ATORVASTATIN CALCIUM 20 MG/1
20 TABLET, FILM COATED ORAL NIGHTLY
Qty: 90 TABLET | Refills: 0 | Status: SHIPPED | OUTPATIENT
Start: 2023-08-17

## 2023-08-21 ENCOUNTER — OFFICE VISIT (OUTPATIENT)
Dept: PRIMARY CARE | Facility: CLINIC | Age: 83
End: 2023-08-21
Payer: COMMERCIAL

## 2023-08-21 VITALS
BODY MASS INDEX: 26.63 KG/M2 | HEART RATE: 50 BPM | HEIGHT: 64 IN | DIASTOLIC BLOOD PRESSURE: 72 MMHG | WEIGHT: 156 LBS | SYSTOLIC BLOOD PRESSURE: 113 MMHG | OXYGEN SATURATION: 95 %

## 2023-08-21 DIAGNOSIS — C90.00 MULTIPLE MYELOMA NOT HAVING ACHIEVED REMISSION (MULTI): ICD-10-CM

## 2023-08-21 DIAGNOSIS — Z00.00 ROUTINE GENERAL MEDICAL EXAMINATION AT HEALTH CARE FACILITY: ICD-10-CM

## 2023-08-21 DIAGNOSIS — G31.84 MCI (MILD COGNITIVE IMPAIRMENT): ICD-10-CM

## 2023-08-21 DIAGNOSIS — Z12.31 ENCOUNTER FOR SCREENING MAMMOGRAM FOR BREAST CANCER: ICD-10-CM

## 2023-08-21 DIAGNOSIS — Z00.00 MEDICARE ANNUAL WELLNESS VISIT, SUBSEQUENT: Primary | ICD-10-CM

## 2023-08-21 DIAGNOSIS — I10 BENIGN ESSENTIAL HYPERTENSION: ICD-10-CM

## 2023-08-21 DIAGNOSIS — E11.9 TYPE 2 DIABETES MELLITUS WITHOUT COMPLICATION, WITHOUT LONG-TERM CURRENT USE OF INSULIN (MULTI): ICD-10-CM

## 2023-08-21 DIAGNOSIS — D47.2 MONOCLONAL GAMMOPATHY OF UNKNOWN SIGNIFICANCE (MGUS): ICD-10-CM

## 2023-08-21 DIAGNOSIS — E78.1 FAMILIAL HYPERTRIGLYCERIDEMIA: ICD-10-CM

## 2023-08-21 DIAGNOSIS — Z78.0 ASYMPTOMATIC MENOPAUSAL STATE: ICD-10-CM

## 2023-08-21 DIAGNOSIS — R94.6 ABNORMAL THYROID EXAM: ICD-10-CM

## 2023-08-21 LAB
ESTIMATED AVERAGE GLUCOSE FOR HBA1C: 160 MG/DL
HEMOGLOBIN A1C/HEMOGLOBIN TOTAL IN BLOOD: 7.2 %
THYROTROPIN (MIU/L) IN SER/PLAS BY DETECTION LIMIT <= 0.05 MIU/L: 0.12 MIU/L (ref 0.44–3.98)
THYROXINE (T4) FREE (NG/DL) IN SER/PLAS: 1.24 NG/DL (ref 0.78–1.48)

## 2023-08-21 PROCEDURE — 80053 COMPREHEN METABOLIC PANEL: CPT

## 2023-08-21 PROCEDURE — 3074F SYST BP LT 130 MM HG: CPT | Performed by: FAMILY MEDICINE

## 2023-08-21 PROCEDURE — 1170F FXNL STATUS ASSESSED: CPT | Performed by: FAMILY MEDICINE

## 2023-08-21 PROCEDURE — 99214 OFFICE O/P EST MOD 30 MIN: CPT | Performed by: FAMILY MEDICINE

## 2023-08-21 PROCEDURE — 84439 ASSAY OF FREE THYROXINE: CPT

## 2023-08-21 PROCEDURE — 1036F TOBACCO NON-USER: CPT | Performed by: FAMILY MEDICINE

## 2023-08-21 PROCEDURE — 3078F DIAST BP <80 MM HG: CPT | Performed by: FAMILY MEDICINE

## 2023-08-21 PROCEDURE — 1160F RVW MEDS BY RX/DR IN RCRD: CPT | Performed by: FAMILY MEDICINE

## 2023-08-21 PROCEDURE — 1159F MED LIST DOCD IN RCRD: CPT | Performed by: FAMILY MEDICINE

## 2023-08-21 PROCEDURE — 84443 ASSAY THYROID STIM HORMONE: CPT

## 2023-08-21 PROCEDURE — G0439 PPPS, SUBSEQ VISIT: HCPCS | Performed by: FAMILY MEDICINE

## 2023-08-21 PROCEDURE — 83036 HEMOGLOBIN GLYCOSYLATED A1C: CPT

## 2023-08-21 RX ORDER — LENALIDOMIDE 15 MG/1
15 CAPSULE ORAL DAILY
COMMUNITY
End: 2023-10-05 | Stop reason: SDUPTHER

## 2023-08-21 ASSESSMENT — ACTIVITIES OF DAILY LIVING (ADL)
STIL DRIVING: NO
FEEDING: INDEPENDENT
MANAGING FINANCES: NEEDS ASSISTANCE
TAKING MEDICATION: NEEDS ASSISTANCE
PREPARING MEALS: NEEDS ASSISTANCE
DOING HOUSEWORK: TOTAL CARE
JUDGMENT_ADEQUATE_SAFELY_COMPLETE_DAILY_ACTIVITIES: NO
USING TELEPHONE: INDEPENDENT
PILL BOX USED: YES
USING TRANSPORTATION: TOTAL CARE
TOILETING: INDEPENDENT
NEEDS ASSISTANCE WITH FOOD: INDEPENDENT
BATHING: INDEPENDENT
EATING: INDEPENDENT
DRESSING: INDEPENDENT
GROCERY SHOPPING: TOTAL CARE
ADEQUATE_TO_COMPLETE_ADL: YES

## 2023-08-21 ASSESSMENT — COLUMBIA-SUICIDE SEVERITY RATING SCALE - C-SSRS
1. IN THE PAST MONTH, HAVE YOU WISHED YOU WERE DEAD OR WISHED YOU COULD GO TO SLEEP AND NOT WAKE UP?: NO
2. HAVE YOU ACTUALLY HAD ANY THOUGHTS OF KILLING YOURSELF?: NO

## 2023-08-21 ASSESSMENT — PATIENT HEALTH QUESTIONNAIRE - PHQ9
2. FEELING DOWN, DEPRESSED OR HOPELESS: NOT AT ALL
SUM OF ALL RESPONSES TO PHQ9 QUESTIONS 1 AND 2: 0
1. LITTLE INTEREST OR PLEASURE IN DOING THINGS: NOT AT ALL

## 2023-08-21 ASSESSMENT — ENCOUNTER SYMPTOMS
DEPRESSION: 0
OCCASIONAL FEELINGS OF UNSTEADINESS: 1
LOSS OF SENSATION IN FEET: 0

## 2023-08-21 NOTE — PROGRESS NOTES
"Subjective   Patient ID: Rolando Mays is a 82 y.o. female who presents for Medicare Annual Wellness Visit Subsequent.      HPI   The patient reports that she is taking atorvastatin for hyperlipidemia, Metformin for diabetes, hydrochlorothiazide, nifedipine and metoprolol for her hypertension, Lexapro daily and Ativan as needed for her depression, anxiety and cognitive impairment and Dexamethasone for Multiple myeloma. She is taking these medications as prescribed and has no side effects from these medications. Her blood pressures are well- controlled at this time and her sister reports that she has been a bit more agitated lately.     Review of Systems  Constitutional: No fever or chills, No Night Sweats  Eyes: No Blurry Vision or Eye sight problems  ENT: No Nasal Discharge, Hoarseness, sore throat  Cardiovascular: no chest pain, no palpitations and no syncope.   Respiratory: no cough, no shortness of breath during exertion and no shortness of breath at rest.   Gastrointestinal: no abdominal pain, no nausea and no vomiting.   : No vaginal discharge, burning with urination, no blood in urine or stools  Skin: No Skin rashes or Lesions  Neuro: No Headache, no dizziness or Numbness or tingling  Psych: + Anxiety, depression or sleeping problems  Heme: No Easy bleeding or brusing.     Objective   /72   Pulse 50   Ht 1.626 m (5' 4\")   Wt 70.8 kg (156 lb)   SpO2 95%   BMI 26.78 kg/m²     Physical Exam  Constitutional: Alert and in no acute distress. Well developed, well nourished.   Head and Face: Head and face: Normal.    Eyes: Normal external exam.   Ears, Nose, Mouth, and Throat: External inspection of ears and nose: Normal.  Hearing: Normal.    Cardiovascular: Heart rate and rhythm were normal, normal S1 and S2. Pedal pulses: Normal. + peripheral edema.   Pulmonary: No respiratory distress. Clear bilateral breath sounds.    Skin: Normal skin color and pigmentation, normal skin turgor, and no rash. "   Psychiatric: Judgment and insight: poor. Mood and affect: Normal.      Lab Results   Component Value Date    WBC 6.7 08/17/2023    HGB 11.1 (L) 08/17/2023    HCT 35.9 (L) 08/17/2023     08/17/2023    CHOL 182 03/31/2022    TRIG 106 03/31/2022    HDL 62.1 03/31/2022    ALT 13 08/17/2023    AST 10 08/17/2023     08/17/2023    K 4.1 08/17/2023     08/17/2023    CREATININE 0.55 08/17/2023    BUN 11 08/17/2023    CO2 27 08/17/2023    TSH 0.47 03/31/2022    HGBA1C 5.9 (A) 05/11/2023           Assessment/Plan   Diagnoses and all orders for this visit:  Medicare annual wellness visit, subsequent  Asymptomatic menopausal state  -     XR DEXA bone density; Future  Encounter for screening mammogram for breast cancer  -     BI mammo bilateral screening tomosynthesis; Future  Routine general medical examination at [facility] facility  Benign essential hypertension  -     Comprehensive Metabolic Panel; Future  Familial hypertriglyceridemia  Type 2 diabetes mellitus without complication, without long-term current use of insulin (CMS/[facility])  -     Hemoglobin A1C; Future  -     Follow Up In [facility] - PCP - Established; Future  Multiple myeloma not having achieved remission (CMS/[facility])  Abnormal thyroid exam  -     TSH with reflex to Free T4 if abnormal; Future        Dear Rolando Mays     It was my pleasure to take care of you today in the office. Below are the things we discussed today:    1. 1. Immunizations: Yearly Flu shot is recommended. Please get flu shot in the fall          a: COVID: Up-to-Date. Please get booster from the pharmacy in the fall          b: Tetanus: Up-to-date         c: Shingrix: Please get from the pharmacy          d: Pneumovax: Up-to-date         e: Prevnar: Up-to-date         F. RSV: Please get from the pharmacy    2. Blood Work: Ordered   3. Seen your dentist twice a year  4. Yearly Eye exam is recommended    5. BMI: Overweight   6: Diet recommendations:   Eat Clean,  Try to have as many home cooked meals as possible  Avoid processed foods which contain excess calories, sugar, and sodium.    7. Exercise recommendations:   150 minutes a week to maintain your weight     If you have to loose weight, you need a better diet and exercise plan.     8. Supplements recommended:  a - Calcium 600 mg up to twice a day to get a total of 1200 mg. Each 8 oz of milk or yogurt or 1 oz of cheese, 1 Banana, 1 serving of green Leafy vegetable has about 300 mg of Calcium, so you may subtract that amount. Calcium citrate is the only acceptable supplement to take if you take an acid suppressing medication like Prilosec; otherwise Calcium carbonate is acceptable too (It can cause Constipation).   b - Vitamin D - 2000 IU daily     9. Please get your Living will / Advance directive completed if you do not have one already. Please make sure our office has a copy of the latest one.     10. Colonoscopy: No repeats recommended   11. Mammogram: Ordered. The patient will decide if she wants to get it done   12. PAP: Not indicated   13. DEXA: Ordered   14: Skin Check: Please see Dermatology once a year for a Skin Check.     15. Hyperlipidemia: Continue atorvastatin.    16. Diabetes: Continue Metformin.    17. Hypertension: Well- controlled. Continue hydrochlorothiazide, nifedipine and metoprolol.    18. Depression, anxiety and cognitive impairment: Increase Ativan to 1 mg daily. Continue Lexapro.    19. Multiple myeloam: Continue Dexamethasone and Revlimid and see Oncology.     20. Assisted living: Encouraged the patient to look into assisted living.     21. Leg edema: Advised the patient to keep legs elevated when she is sitting and try moving around more.    Follow up in 3 months.     Follow up in one year for a Physical. Please call the office before your Physical to see if you need blood work completed prior to your physical.     Please call me if any questions arise from now until your next visit. I will  call you after I am done seeing patients. A Doctor is always available by phone when the office is closed. Please feel free to call for help with any problem that you feel shouldn't wait until the office re-opens.     Scribe Attestation  By signing my name below, IKayla Scribe   attest that this documentation has been prepared under the direction and in the presence of Mena Menjivar MD.

## 2023-08-22 LAB
ALANINE AMINOTRANSFERASE (SGPT) (U/L) IN SER/PLAS: 13 U/L (ref 7–45)
ALBUMIN (G/DL) IN SER/PLAS: 3.7 G/DL (ref 3.4–5)
ALKALINE PHOSPHATASE (U/L) IN SER/PLAS: 80 U/L (ref 33–136)
ANION GAP IN SER/PLAS: 13 MMOL/L (ref 10–20)
ASPARTATE AMINOTRANSFERASE (SGOT) (U/L) IN SER/PLAS: 11 U/L (ref 9–39)
BILIRUBIN TOTAL (MG/DL) IN SER/PLAS: 1.4 MG/DL (ref 0–1.2)
CALCIUM (MG/DL) IN SER/PLAS: 9.1 MG/DL (ref 8.6–10.6)
CARBON DIOXIDE, TOTAL (MMOL/L) IN SER/PLAS: 29 MMOL/L (ref 21–32)
CHLORIDE (MMOL/L) IN SER/PLAS: 106 MMOL/L (ref 98–107)
CREATININE (MG/DL) IN SER/PLAS: 0.65 MG/DL (ref 0.5–1.05)
GFR FEMALE: 88 ML/MIN/1.73M2
GLUCOSE (MG/DL) IN SER/PLAS: 112 MG/DL (ref 74–99)
POTASSIUM (MMOL/L) IN SER/PLAS: 3.8 MMOL/L (ref 3.5–5.3)
PROTEIN TOTAL: 6.1 G/DL (ref 6.4–8.2)
SODIUM (MMOL/L) IN SER/PLAS: 144 MMOL/L (ref 136–145)
UREA NITROGEN (MG/DL) IN SER/PLAS: 15 MG/DL (ref 6–23)

## 2023-09-06 DIAGNOSIS — I10 ESSENTIAL (PRIMARY) HYPERTENSION: ICD-10-CM

## 2023-09-06 RX ORDER — HYDROCHLOROTHIAZIDE 25 MG/1
25 TABLET ORAL DAILY
Qty: 30 TABLET | Refills: 2 | Status: SHIPPED | OUTPATIENT
Start: 2023-09-06 | End: 2023-10-06 | Stop reason: ENTERED-IN-ERROR

## 2023-09-09 PROBLEM — N28.9 RENAL INSUFFICIENCY: Status: ACTIVE | Noted: 2023-09-09

## 2023-09-09 PROBLEM — C44.92 SCC (SQUAMOUS CELL CARCINOMA): Status: ACTIVE | Noted: 2023-09-09

## 2023-09-09 PROBLEM — E78.5 HYPERLIPIDEMIA: Status: ACTIVE | Noted: 2023-09-09

## 2023-09-09 PROBLEM — I10 HYPERTENSION: Status: ACTIVE | Noted: 2017-04-15

## 2023-09-09 PROBLEM — R94.31 ABNORMAL ELECTROCARDIOGRAPHY: Status: ACTIVE | Noted: 2023-09-09

## 2023-09-09 PROBLEM — I16.1 HYPERTENSIVE EMERGENCY WITHOUT CONGESTIVE HEART FAILURE: Status: ACTIVE | Noted: 2023-09-09

## 2023-09-09 PROBLEM — R00.0 SINUS TACHYCARDIA: Status: ACTIVE | Noted: 2023-09-09

## 2023-09-09 PROBLEM — R20.2 FACIAL PARESTHESIA: Status: ACTIVE | Noted: 2023-09-09

## 2023-09-09 PROBLEM — D64.9 ANEMIA: Status: ACTIVE | Noted: 2023-09-09

## 2023-09-09 PROBLEM — R63.4 WEIGHT LOSS, NON-INTENTIONAL: Status: ACTIVE | Noted: 2023-09-09

## 2023-09-09 RX ORDER — DEXAMETHASONE 4 MG/1
5 TABLET ORAL
COMMUNITY
Start: 2023-08-21

## 2023-09-09 RX ORDER — VALSARTAN 320 MG/1
320 TABLET ORAL DAILY
COMMUNITY
End: 2023-10-06 | Stop reason: ENTERED-IN-ERROR

## 2023-09-09 RX ORDER — FUROSEMIDE 20 MG/1
20 TABLET ORAL DAILY
COMMUNITY
End: 2023-10-06 | Stop reason: ENTERED-IN-ERROR

## 2023-09-11 ENCOUNTER — APPOINTMENT (OUTPATIENT)
Dept: PRIMARY CARE | Facility: CLINIC | Age: 83
End: 2023-09-11
Payer: COMMERCIAL

## 2023-09-13 ENCOUNTER — TELEPHONE (OUTPATIENT)
Dept: PRIMARY CARE | Facility: CLINIC | Age: 83
End: 2023-09-13
Payer: COMMERCIAL

## 2023-09-13 DIAGNOSIS — F41.9 ANXIETY: ICD-10-CM

## 2023-09-13 RX ORDER — LORAZEPAM 1 MG/1
1 TABLET ORAL 2 TIMES DAILY PRN
Qty: 60 TABLET | Refills: 2 | Status: SHIPPED | OUTPATIENT
Start: 2023-09-13

## 2023-09-14 DIAGNOSIS — E11.9 TYPE 2 DIABETES MELLITUS WITHOUT COMPLICATION, WITHOUT LONG-TERM CURRENT USE OF INSULIN (MULTI): ICD-10-CM

## 2023-09-14 RX ORDER — METFORMIN HYDROCHLORIDE 500 MG/1
500 TABLET ORAL
Qty: 90 TABLET | Refills: 0 | Status: SHIPPED | OUTPATIENT
Start: 2023-09-14 | End: 2023-12-13

## 2023-09-23 DIAGNOSIS — F41.9 ANXIETY DISORDER, UNSPECIFIED: ICD-10-CM

## 2023-09-25 RX ORDER — ESCITALOPRAM OXALATE 20 MG/1
TABLET ORAL
Qty: 90 TABLET | Refills: 0 | Status: SHIPPED | OUTPATIENT
Start: 2023-09-25

## 2023-09-28 DIAGNOSIS — C90.00 MULTIPLE MYELOMA NOT HAVING ACHIEVED REMISSION (MULTI): Primary | ICD-10-CM

## 2023-09-28 RX ORDER — ALBUTEROL SULFATE 0.83 MG/ML
3 SOLUTION RESPIRATORY (INHALATION) AS NEEDED
Status: CANCELLED | OUTPATIENT
Start: 2023-10-12

## 2023-09-28 RX ORDER — FAMOTIDINE 10 MG/ML
20 INJECTION INTRAVENOUS ONCE AS NEEDED
Status: CANCELLED | OUTPATIENT
Start: 2023-10-12

## 2023-09-28 RX ORDER — DIPHENHYDRAMINE HYDROCHLORIDE 50 MG/ML
50 INJECTION INTRAMUSCULAR; INTRAVENOUS AS NEEDED
Status: CANCELLED | OUTPATIENT
Start: 2023-10-12

## 2023-09-28 RX ORDER — EPINEPHRINE 0.3 MG/.3ML
0.3 INJECTION SUBCUTANEOUS EVERY 5 MIN PRN
Status: CANCELLED | OUTPATIENT
Start: 2023-10-12

## 2023-09-28 RX ORDER — HEPARIN 100 UNIT/ML
500 SYRINGE INTRAVENOUS AS NEEDED
OUTPATIENT
Start: 2023-09-30

## 2023-09-28 RX ORDER — HEPARIN SODIUM,PORCINE/PF 10 UNIT/ML
50 SYRINGE (ML) INTRAVENOUS AS NEEDED
OUTPATIENT
Start: 2023-09-30

## 2023-10-05 ENCOUNTER — DOCUMENTATION (OUTPATIENT)
Dept: HEMATOLOGY/ONCOLOGY | Facility: HOSPITAL | Age: 83
End: 2023-10-05

## 2023-10-05 ENCOUNTER — HOSPITAL ENCOUNTER (OUTPATIENT)
Facility: HOSPITAL | Age: 83
Setting detail: OBSERVATION
Discharge: HOME | End: 2023-10-07
Attending: EMERGENCY MEDICINE | Admitting: INTERNAL MEDICINE
Payer: COMMERCIAL

## 2023-10-05 ENCOUNTER — LAB (OUTPATIENT)
Dept: LAB | Facility: HOSPITAL | Age: 83
End: 2023-10-05
Payer: COMMERCIAL

## 2023-10-05 ENCOUNTER — ONCOLOGY MEDICATION OUTREACH (OUTPATIENT)
Dept: HEMATOLOGY/ONCOLOGY | Facility: HOSPITAL | Age: 83
End: 2023-10-05
Payer: COMMERCIAL

## 2023-10-05 DIAGNOSIS — N17.9 ACUTE KIDNEY INJURY (CMS-HCC): ICD-10-CM

## 2023-10-05 DIAGNOSIS — E83.42 HYPOMAGNESEMIA: Primary | ICD-10-CM

## 2023-10-05 DIAGNOSIS — C90.00 MULTIPLE MYELOMA NOT HAVING ACHIEVED REMISSION (MULTI): ICD-10-CM

## 2023-10-05 DIAGNOSIS — E87.6 HYPOKALEMIA: ICD-10-CM

## 2023-10-05 DIAGNOSIS — F41.9 ANXIETY: ICD-10-CM

## 2023-10-05 DIAGNOSIS — C90.00 MULTIPLE MYELOMA NOT HAVING ACHIEVED REMISSION (MULTI): Primary | ICD-10-CM

## 2023-10-05 DIAGNOSIS — C90.00 MULTIPLE MYELOMA, REMISSION STATUS UNSPECIFIED (MULTI): Primary | ICD-10-CM

## 2023-10-05 LAB
ALBUMIN SERPL BCP-MCNC: 3.8 G/DL (ref 3.4–5)
ALP SERPL-CCNC: 66 U/L (ref 33–136)
ALT SERPL W P-5'-P-CCNC: 10 U/L (ref 7–45)
ANION GAP SERPL CALC-SCNC: 14 MMOL/L (ref 10–20)
ANION GAP SERPL CALC-SCNC: 17 MMOL/L (ref 10–20)
AST SERPL W P-5'-P-CCNC: 10 U/L (ref 9–39)
BASOPHILS # BLD AUTO: 0.01 X10*3/UL (ref 0–0.1)
BASOPHILS NFR BLD AUTO: 0.2 %
BILIRUB SERPL-MCNC: 2.1 MG/DL (ref 0–1.2)
BUN SERPL-MCNC: 11 MG/DL (ref 6–23)
BUN SERPL-MCNC: 13 MG/DL (ref 6–23)
CALCIUM SERPL-MCNC: 7.6 MG/DL (ref 8.6–10.3)
CALCIUM SERPL-MCNC: 7.9 MG/DL (ref 8.6–10.3)
CHLORIDE SERPL-SCNC: 100 MMOL/L (ref 98–107)
CHLORIDE SERPL-SCNC: 103 MMOL/L (ref 98–107)
CO2 SERPL-SCNC: 25 MMOL/L (ref 21–32)
CO2 SERPL-SCNC: 29 MMOL/L (ref 21–32)
CREAT SERPL-MCNC: 0.78 MG/DL (ref 0.5–1.05)
CREAT SERPL-MCNC: 1.06 MG/DL (ref 0.5–1.05)
EOSINOPHIL # BLD AUTO: 0.34 X10*3/UL (ref 0–0.4)
EOSINOPHIL NFR BLD AUTO: 5.6 %
ERYTHROCYTE [DISTWIDTH] IN BLOOD BY AUTOMATED COUNT: 17.8 % (ref 11.5–14.5)
GFR SERPL CREATININE-BSD FRML MDRD: 53 ML/MIN/1.73M*2
GFR SERPL CREATININE-BSD FRML MDRD: 76 ML/MIN/1.73M*2
GLUCOSE SERPL-MCNC: 145 MG/DL (ref 74–99)
GLUCOSE SERPL-MCNC: 172 MG/DL (ref 74–99)
HCT VFR BLD AUTO: 37 % (ref 36–46)
HGB BLD-MCNC: 11.2 G/DL (ref 12–16)
IMM GRANULOCYTES # BLD AUTO: 0.06 X10*3/UL (ref 0–0.5)
IMM GRANULOCYTES NFR BLD AUTO: 1 % (ref 0–0.9)
LDH SERPL L TO P-CCNC: 187 U/L (ref 84–246)
LYMPHOCYTES # BLD AUTO: 1.88 X10*3/UL (ref 0.8–3)
LYMPHOCYTES NFR BLD AUTO: 31.2 %
MAGNESIUM SERPL-MCNC: 1.2 MG/DL (ref 1.6–2.4)
MCH RBC QN AUTO: 25.6 PG (ref 26–34)
MCHC RBC AUTO-ENTMCNC: 30.3 G/DL (ref 32–36)
MCV RBC AUTO: 85 FL (ref 80–100)
MONOCYTES # BLD AUTO: 0.54 X10*3/UL (ref 0.05–0.8)
MONOCYTES NFR BLD AUTO: 9 %
NEUTROPHILS # BLD AUTO: 3.19 X10*3/UL (ref 1.6–5.5)
NEUTROPHILS NFR BLD AUTO: 53 %
NRBC BLD-RTO: 0 /100 WBCS (ref 0–0)
PLATELET # BLD AUTO: 210 X10*3/UL (ref 150–450)
PMV BLD AUTO: 12.7 FL (ref 7.5–11.5)
POTASSIUM SERPL-SCNC: 2.9 MMOL/L (ref 3.5–5.3)
POTASSIUM SERPL-SCNC: 3 MMOL/L (ref 3.5–5.3)
PROT SERPL-MCNC: 6.7 G/DL (ref 6.4–8.2)
PROT SERPL-MCNC: 6.7 G/DL (ref 6.4–8.2)
RBC # BLD AUTO: 4.38 X10*6/UL (ref 4–5.2)
SODIUM SERPL-SCNC: 140 MMOL/L (ref 136–145)
SODIUM SERPL-SCNC: 142 MMOL/L (ref 136–145)
WBC # BLD AUTO: 6 X10*3/UL (ref 4.4–11.3)

## 2023-10-05 PROCEDURE — 96365 THER/PROPH/DIAG IV INF INIT: CPT

## 2023-10-05 PROCEDURE — 83521 IG LIGHT CHAINS FREE EACH: CPT

## 2023-10-05 PROCEDURE — 80053 COMPREHEN METABOLIC PANEL: CPT

## 2023-10-05 PROCEDURE — 86334 IMMUNOFIX E-PHORESIS SERUM: CPT | Mod: CMCLAB

## 2023-10-05 PROCEDURE — 99285 EMERGENCY DEPT VISIT HI MDM: CPT | Performed by: EMERGENCY MEDICINE

## 2023-10-05 PROCEDURE — 80048 BASIC METABOLIC PNL TOTAL CA: CPT | Performed by: EMERGENCY MEDICINE

## 2023-10-05 PROCEDURE — 36415 COLL VENOUS BLD VENIPUNCTURE: CPT

## 2023-10-05 PROCEDURE — 84155 ASSAY OF PROTEIN SERUM: CPT | Mod: 59

## 2023-10-05 PROCEDURE — 96361 HYDRATE IV INFUSION ADD-ON: CPT

## 2023-10-05 PROCEDURE — 83615 LACTATE (LD) (LDH) ENZYME: CPT

## 2023-10-05 PROCEDURE — 84165 PROTEIN E-PHORESIS SERUM: CPT | Performed by: STUDENT IN AN ORGANIZED HEALTH CARE EDUCATION/TRAINING PROGRAM

## 2023-10-05 PROCEDURE — 86320 SERUM IMMUNOELECTROPHORESIS: CPT | Performed by: STUDENT IN AN ORGANIZED HEALTH CARE EDUCATION/TRAINING PROGRAM

## 2023-10-05 PROCEDURE — 36415 COLL VENOUS BLD VENIPUNCTURE: CPT | Performed by: EMERGENCY MEDICINE

## 2023-10-05 PROCEDURE — 85025 COMPLETE CBC W/AUTO DIFF WBC: CPT

## 2023-10-05 PROCEDURE — 82784 ASSAY IGA/IGD/IGG/IGM EACH: CPT

## 2023-10-05 PROCEDURE — 83735 ASSAY OF MAGNESIUM: CPT | Performed by: EMERGENCY MEDICINE

## 2023-10-05 PROCEDURE — 84165 PROTEIN E-PHORESIS SERUM: CPT

## 2023-10-05 PROCEDURE — 84165 PROTEIN E-PHORESIS SERUM: CPT | Mod: CMCLAB

## 2023-10-05 PROCEDURE — 82784 ASSAY IGA/IGD/IGG/IGM EACH: CPT | Mod: 59

## 2023-10-05 PROCEDURE — 2580000001 HC RX 258 IV SOLUTIONS

## 2023-10-05 RX ORDER — POTASSIUM CHLORIDE 20 MEQ/1
60 TABLET, EXTENDED RELEASE ORAL DAILY
Status: DISCONTINUED | OUTPATIENT
Start: 2023-10-06 | End: 2023-10-05

## 2023-10-05 RX ORDER — MAGNESIUM SULFATE HEPTAHYDRATE 40 MG/ML
2 INJECTION, SOLUTION INTRAVENOUS ONCE
Status: COMPLETED | OUTPATIENT
Start: 2023-10-05 | End: 2023-10-06

## 2023-10-05 RX ORDER — LENALIDOMIDE 15 MG/1
CAPSULE ORAL
Qty: 21 CAPSULE | Refills: 0 | Status: SHIPPED | OUTPATIENT
Start: 2023-10-05 | End: 2023-10-30 | Stop reason: SDUPTHER

## 2023-10-05 RX ORDER — POTASSIUM CHLORIDE 20 MEQ/1
60 TABLET, EXTENDED RELEASE ORAL ONCE
Status: COMPLETED | OUTPATIENT
Start: 2023-10-05 | End: 2023-10-06

## 2023-10-05 RX ADMIN — SODIUM CHLORIDE, POTASSIUM CHLORIDE, SODIUM LACTATE AND CALCIUM CHLORIDE 500 ML: 600; 310; 30; 20 INJECTION, SOLUTION INTRAVENOUS at 22:28

## 2023-10-05 ASSESSMENT — PAIN DESCRIPTION - PROGRESSION: CLINICAL_PROGRESSION: NOT CHANGED

## 2023-10-05 ASSESSMENT — COLUMBIA-SUICIDE SEVERITY RATING SCALE - C-SSRS
2. HAVE YOU ACTUALLY HAD ANY THOUGHTS OF KILLING YOURSELF?: NO
1. IN THE PAST MONTH, HAVE YOU WISHED YOU WERE DEAD OR WISHED YOU COULD GO TO SLEEP AND NOT WAKE UP?: NO
6. HAVE YOU EVER DONE ANYTHING, STARTED TO DO ANYTHING, OR PREPARED TO DO ANYTHING TO END YOUR LIFE?: NO

## 2023-10-05 ASSESSMENT — PAIN - FUNCTIONAL ASSESSMENT: PAIN_FUNCTIONAL_ASSESSMENT: 0-10

## 2023-10-05 ASSESSMENT — PAIN SCALES - GENERAL: PAINLEVEL_OUTOF10: 0 - NO PAIN

## 2023-10-05 NOTE — PROGRESS NOTES
ONCOLOGY CLINICAL PHARMACY NOTE     Subjective  Rolando Mays is a 83 y.o. female with MM, here for refill support.        Treatment history  Treatment Details   Treatment goal [No plan goal]   Plan Name Venous Access Orders   Status Active   Start Date 9/30/2023   End Date Until discontinued   Provider Arabella Correa MD   Chemotherapy [No matching medication found in this treatment plan]     Treatment Details   Treatment goal [No plan goal]   Plan Name Denosumab (Xgeva), 28 Day Cycles   Status Active   Start Date 10/12/2023   End Date Until discontinued   Provider Arabella Correa MD   Chemotherapy [No matching medication found in this treatment plan]        Objective  There were no vitals taken for this visit.  Lab Results   Component Value Date    WBC 5.8 09/14/2023    HGB 10.9 (L) 09/14/2023    HCT 36.8 09/14/2023    MCV 85 09/14/2023     09/14/2023      Lab Results   Component Value Date    GLUCOSE 129 (H) 09/14/2023    CALCIUM 7.8 (L) 09/14/2023     09/14/2023    K 4.0 09/14/2023    CO2 27 09/14/2023     (H) 09/14/2023    BUN 11 09/14/2023    CREATININE 0.66 09/14/2023     Lab Results   Component Value Date    ALT 11 09/14/2023    AST 9 09/14/2023    ALKPHOS 73 09/14/2023    BILITOT 1.1 09/14/2023       Allergies and Medications   No Known Allergies    Current Outpatient Medications:     atorvastatin (Lipitor) 20 mg tablet, TAKE 1 TABLET (20 MG) BY MOUTH ONCE DAILY AT BEDTIME., Disp: 90 tablet, Rfl: 0    blood sugar diagnostic (ACCU-CHEK DORIS PLUS TEST STRP MISC), 1 strip 1 (one) time each day., Disp: , Rfl:     dexAMETHasone (Decadron) 4 mg tablet, Take 5 tablets (20 mg) by mouth 1 (one) time per week.  TAKE 5 TABLETS BY MOUTH ONCE EVERY 7 DAYS ON THURSDAY, Disp: , Rfl:     dexAMETHasone 20 mg tablet, Take by mouth., Disp: , Rfl:     escitalopram (Lexapro) 20 mg tablet, TAKE 1 TABLET BY MOUTH EVERY DAY, Disp: 90 tablet, Rfl: 0    flash glucose sensor (FREESTYLE JULIO CESAR 2 SENSOR  MISC), every 14 (fourteen) days. Use as directed to monitor blood sugar, Disp: , Rfl:     fluticasone (Flonase) 50 mcg/actuation nasal spray, Administer 1 spray into each nostril 2 times a day., Disp: , Rfl:     furosemide (Lasix) 20 mg tablet, Take 1 tablet (20 mg) by mouth once daily., Disp: , Rfl:     hydroCHLOROthiazide (HYDRODiuril) 25 mg tablet, Take 1 tablet (25 mg) by mouth once daily., Disp: 30 tablet, Rfl: 2    lancets misc, 1 (one) time each day. Test blood sugar, Disp: , Rfl:     lenalidomide (Revlimid) 15 mg capsule, Take one capsule by mouth once daily for 21 days, then 7 days off (28-day cycle). Take whole with water. Do not break, chew, or open capsules., Disp: 21 capsule, Rfl: 0    LORazepam (Ativan) 1 mg tablet, Take 1 tablet (1 mg) by mouth 2 times a day as needed for anxiety., Disp: 60 tablet, Rfl: 2    metFORMIN (Glucophage) 500 mg tablet, TAKE 1 TABLET (500 MG) BY MOUTH ONCE DAILY WITH A MEAL., Disp: 90 tablet, Rfl: 0    metoprolol succinate XL (Toprol-XL) 100 mg 24 hr tablet, TAKE 1 TABLET BY MOUTH EVERY DAY, Disp: 90 tablet, Rfl: 0    NIFEdipine CC 30 mg 24 hr tablet, TAKE 1 TABLET BY MOUTH AT BEDTIME AND TAKE AN ADDITIONAL TABLET IN THE MORNING IF NEEDED, Disp: 180 tablet, Rfl: 0    valsartan (Diovan) 320 mg tablet, Take 1 tablet (320 mg) by mouth once daily., Disp: , Rfl:     Assessment and Plan  Rolando Mays is a 83 y.o. female with MM, to be treated with lenalidomide.    Per Dr. Correa's authroization, on 10/5/23, I refilled Lenalidomide 15 mg QD x21 days, then 7 days off, for a 28 cycle. #21, 0 RF. Auth# ?[id number] obtained and prescription sent to [facility].     Patient is taking Aspirin 81 mg QD for thromboembolic prophylaxis.        Carrillo Daniel, PharmD

## 2023-10-06 PROBLEM — E87.6 HYPOKALEMIA: Status: ACTIVE | Noted: 2023-10-06

## 2023-10-06 PROBLEM — N17.9 AKI (ACUTE KIDNEY INJURY) (CMS-HCC): Status: ACTIVE | Noted: 2023-10-06

## 2023-10-06 LAB
ANION GAP SERPL CALC-SCNC: 13 MMOL/L (ref 10–20)
ANION GAP SERPL CALC-SCNC: 14 MMOL/L (ref 10–20)
ANION GAP SERPL CALC-SCNC: 15 MMOL/L (ref 10–20)
BUN SERPL-MCNC: 10 MG/DL (ref 6–23)
BUN SERPL-MCNC: 10 MG/DL (ref 6–23)
BUN SERPL-MCNC: 12 MG/DL (ref 6–23)
CALCIUM SERPL-MCNC: 7 MG/DL (ref 8.6–10.3)
CALCIUM SERPL-MCNC: 7.2 MG/DL (ref 8.6–10.3)
CALCIUM SERPL-MCNC: 7.2 MG/DL (ref 8.6–10.3)
CHLORIDE SERPL-SCNC: 102 MMOL/L (ref 98–107)
CO2 SERPL-SCNC: 23 MMOL/L (ref 21–32)
CO2 SERPL-SCNC: 25 MMOL/L (ref 21–32)
CO2 SERPL-SCNC: 28 MMOL/L (ref 21–32)
CREAT SERPL-MCNC: 0.58 MG/DL (ref 0.5–1.05)
CREAT SERPL-MCNC: 0.63 MG/DL (ref 0.5–1.05)
CREAT SERPL-MCNC: 0.72 MG/DL (ref 0.5–1.05)
GFR SERPL CREATININE-BSD FRML MDRD: 84 ML/MIN/1.73M*2
GFR SERPL CREATININE-BSD FRML MDRD: 89 ML/MIN/1.73M*2
GFR SERPL CREATININE-BSD FRML MDRD: 90 ML/MIN/1.73M*2
GLUCOSE BLD MANUAL STRIP-MCNC: 123 MG/DL (ref 74–99)
GLUCOSE BLD MANUAL STRIP-MCNC: 144 MG/DL (ref 74–99)
GLUCOSE BLD MANUAL STRIP-MCNC: 178 MG/DL (ref 74–99)
GLUCOSE SERPL-MCNC: 109 MG/DL (ref 74–99)
GLUCOSE SERPL-MCNC: 171 MG/DL (ref 74–99)
GLUCOSE SERPL-MCNC: 241 MG/DL (ref 74–99)
IGA SERPL-MCNC: 70 MG/DL (ref 70–400)
IGG SERPL-MCNC: 865 MG/DL (ref 700–1600)
IGM SERPL-MCNC: 22 MG/DL (ref 40–230)
KAPPA LC SERPL-MCNC: 2.99 MG/DL (ref 0.33–1.94)
KAPPA LC/LAMBDA SER: 0.07 {RATIO} (ref 0.26–1.65)
LAMBDA LC SERPL-MCNC: 45.87 MG/DL (ref 0.57–2.63)
MAGNESIUM SERPL-MCNC: 2.4 MG/DL (ref 1.6–2.4)
POTASSIUM SERPL-SCNC: 3.4 MMOL/L (ref 3.5–5.3)
POTASSIUM SERPL-SCNC: 3.4 MMOL/L (ref 3.5–5.3)
POTASSIUM SERPL-SCNC: 3.6 MMOL/L (ref 3.5–5.3)
SODIUM SERPL-SCNC: 136 MMOL/L (ref 136–145)
SODIUM SERPL-SCNC: 137 MMOL/L (ref 136–145)
SODIUM SERPL-SCNC: 141 MMOL/L (ref 136–145)

## 2023-10-06 PROCEDURE — 80048 BASIC METABOLIC PNL TOTAL CA: CPT | Performed by: EMERGENCY MEDICINE

## 2023-10-06 PROCEDURE — 82947 ASSAY GLUCOSE BLOOD QUANT: CPT | Mod: 59

## 2023-10-06 PROCEDURE — G0378 HOSPITAL OBSERVATION PER HR: HCPCS

## 2023-10-06 PROCEDURE — 99232 SBSQ HOSP IP/OBS MODERATE 35: CPT | Performed by: PHYSICIAN ASSISTANT

## 2023-10-06 PROCEDURE — 84295 ASSAY OF SERUM SODIUM: CPT | Performed by: PHYSICIAN ASSISTANT

## 2023-10-06 PROCEDURE — 2500000004 HC RX 250 GENERAL PHARMACY W/ HCPCS (ALT 636 FOR OP/ED): Performed by: PHYSICIAN ASSISTANT

## 2023-10-06 PROCEDURE — 2500000001 HC RX 250 WO HCPCS SELF ADMINISTERED DRUGS (ALT 637 FOR MEDICARE OP)

## 2023-10-06 PROCEDURE — 83735 ASSAY OF MAGNESIUM: CPT | Performed by: EMERGENCY MEDICINE

## 2023-10-06 PROCEDURE — 99222 1ST HOSP IP/OBS MODERATE 55: CPT

## 2023-10-06 PROCEDURE — 80048 BASIC METABOLIC PNL TOTAL CA: CPT | Performed by: PHYSICIAN ASSISTANT

## 2023-10-06 PROCEDURE — 2500000004 HC RX 250 GENERAL PHARMACY W/ HCPCS (ALT 636 FOR OP/ED): Performed by: EMERGENCY MEDICINE

## 2023-10-06 PROCEDURE — 36415 COLL VENOUS BLD VENIPUNCTURE: CPT | Performed by: EMERGENCY MEDICINE

## 2023-10-06 PROCEDURE — 2500000002 HC RX 250 W HCPCS SELF ADMINISTERED DRUGS (ALT 637 FOR MEDICARE OP, ALT 636 FOR OP/ED)

## 2023-10-06 PROCEDURE — 36415 COLL VENOUS BLD VENIPUNCTURE: CPT | Performed by: PHYSICIAN ASSISTANT

## 2023-10-06 RX ORDER — METOPROLOL SUCCINATE 50 MG/1
100 TABLET, EXTENDED RELEASE ORAL DAILY
Status: DISCONTINUED | OUTPATIENT
Start: 2023-10-06 | End: 2023-10-07 | Stop reason: HOSPADM

## 2023-10-06 RX ORDER — NIFEDIPINE 30 MG/1
30 TABLET, FILM COATED, EXTENDED RELEASE ORAL DAILY
Status: DISCONTINUED | OUTPATIENT
Start: 2023-10-06 | End: 2023-10-07 | Stop reason: HOSPADM

## 2023-10-06 RX ORDER — LORAZEPAM 0.5 MG/1
0.5 TABLET ORAL ONCE
Status: COMPLETED | OUTPATIENT
Start: 2023-10-06 | End: 2023-10-06

## 2023-10-06 RX ORDER — ESCITALOPRAM OXALATE 20 MG/1
20 TABLET ORAL DAILY
Status: DISCONTINUED | OUTPATIENT
Start: 2023-10-06 | End: 2023-10-07 | Stop reason: HOSPADM

## 2023-10-06 RX ORDER — ONDANSETRON 4 MG/1
4 TABLET, ORALLY DISINTEGRATING ORAL EVERY 8 HOURS PRN
Status: DISCONTINUED | OUTPATIENT
Start: 2023-10-06 | End: 2023-10-07 | Stop reason: HOSPADM

## 2023-10-06 RX ORDER — HYDROCHLOROTHIAZIDE 25 MG/1
25 TABLET ORAL DAILY
Status: DISCONTINUED | OUTPATIENT
Start: 2023-10-06 | End: 2023-10-07 | Stop reason: HOSPADM

## 2023-10-06 RX ORDER — ASPIRIN 81 MG/1
81 TABLET ORAL DAILY
COMMUNITY

## 2023-10-06 RX ORDER — POTASSIUM CHLORIDE 20 MEQ/1
40 TABLET, EXTENDED RELEASE ORAL DAILY
Status: DISCONTINUED | OUTPATIENT
Start: 2023-10-06 | End: 2023-10-07 | Stop reason: HOSPADM

## 2023-10-06 RX ORDER — ACETAMINOPHEN 650 MG/1
650 SUPPOSITORY RECTAL EVERY 4 HOURS PRN
Status: DISCONTINUED | OUTPATIENT
Start: 2023-10-06 | End: 2023-10-07 | Stop reason: HOSPADM

## 2023-10-06 RX ORDER — ACETAMINOPHEN 325 MG/1
650 TABLET ORAL EVERY 4 HOURS PRN
Status: DISCONTINUED | OUTPATIENT
Start: 2023-10-06 | End: 2023-10-07 | Stop reason: HOSPADM

## 2023-10-06 RX ORDER — ATORVASTATIN CALCIUM 20 MG/1
20 TABLET, FILM COATED ORAL NIGHTLY
Status: DISCONTINUED | OUTPATIENT
Start: 2023-10-06 | End: 2023-10-07 | Stop reason: HOSPADM

## 2023-10-06 RX ORDER — LENALIDOMIDE 15 MG/1
1 CAPSULE ORAL DAILY
COMMUNITY
Start: 2023-06-29 | End: 2023-10-07 | Stop reason: HOSPADM

## 2023-10-06 RX ORDER — ONDANSETRON HYDROCHLORIDE 2 MG/ML
4 INJECTION, SOLUTION INTRAVENOUS EVERY 8 HOURS PRN
Status: DISCONTINUED | OUTPATIENT
Start: 2023-10-06 | End: 2023-10-07 | Stop reason: HOSPADM

## 2023-10-06 RX ORDER — ACETAMINOPHEN 160 MG/5ML
650 SOLUTION ORAL EVERY 4 HOURS PRN
Status: DISCONTINUED | OUTPATIENT
Start: 2023-10-06 | End: 2023-10-07 | Stop reason: HOSPADM

## 2023-10-06 RX ORDER — METFORMIN HYDROCHLORIDE 500 MG/1
500 TABLET ORAL
Status: DISCONTINUED | OUTPATIENT
Start: 2023-10-06 | End: 2023-10-07 | Stop reason: HOSPADM

## 2023-10-06 RX ORDER — TALC
3 POWDER (GRAM) TOPICAL NIGHTLY PRN
Status: DISCONTINUED | OUTPATIENT
Start: 2023-10-06 | End: 2023-10-07 | Stop reason: HOSPADM

## 2023-10-06 RX ADMIN — METFORMIN HYDROCHLORIDE 500 MG: 500 TABLET, FILM COATED ORAL at 08:42

## 2023-10-06 RX ADMIN — DEXAMETHASONE 20 MG: 6 TABLET ORAL at 13:38

## 2023-10-06 RX ADMIN — POTASSIUM CHLORIDE 60 MEQ: 1500 TABLET, EXTENDED RELEASE ORAL at 00:01

## 2023-10-06 RX ADMIN — LORAZEPAM 0.5 MG: 0.5 TABLET ORAL at 00:26

## 2023-10-06 RX ADMIN — POTASSIUM CHLORIDE 40 MEQ: 1500 TABLET, EXTENDED RELEASE ORAL at 13:38

## 2023-10-06 RX ADMIN — ESCITALOPRAM OXALATE 20 MG: 20 TABLET, FILM COATED ORAL at 08:41

## 2023-10-06 RX ADMIN — MAGNESIUM SULFATE HEPTAHYDRATE 2 G: 40 INJECTION, SOLUTION INTRAVENOUS at 00:01

## 2023-10-06 RX ADMIN — LORAZEPAM 0.5 MG: 0.5 TABLET ORAL at 02:11

## 2023-10-06 SDOH — ECONOMIC STABILITY: INCOME INSECURITY: HOW HARD IS IT FOR YOU TO PAY FOR THE VERY BASICS LIKE FOOD, HOUSING, MEDICAL CARE, AND HEATING?: NOT HARD AT ALL

## 2023-10-06 SDOH — SOCIAL STABILITY: SOCIAL NETWORK
DO YOU BELONG TO ANY CLUBS OR ORGANIZATIONS SUCH AS CHURCH GROUPS UNIONS, FRATERNAL OR ATHLETIC GROUPS, OR SCHOOL GROUPS?: YES

## 2023-10-06 SDOH — SOCIAL STABILITY: SOCIAL INSECURITY: ARE YOU OR HAVE YOU BEEN THREATENED OR ABUSED PHYSICALLY, EMOTIONALLY, OR SEXUALLY BY ANYONE?: NO

## 2023-10-06 SDOH — SOCIAL STABILITY: SOCIAL NETWORK: ARE YOU MARRIED, WIDOWED, DIVORCED, SEPARATED, NEVER MARRIED, OR LIVING WITH A PARTNER?: NEVER MARRIED

## 2023-10-06 SDOH — ECONOMIC STABILITY: HOUSING INSECURITY: IN THE LAST 12 MONTHS, HOW MANY PLACES HAVE YOU LIVED?: 1

## 2023-10-06 SDOH — ECONOMIC STABILITY: INCOME INSECURITY: IN THE PAST 12 MONTHS, HAS THE ELECTRIC, GAS, OIL, OR WATER COMPANY THREATENED TO SHUT OFF SERVICE IN YOUR HOME?: NO

## 2023-10-06 SDOH — HEALTH STABILITY: MENTAL HEALTH: HOW OFTEN DO YOU HAVE 6 OR MORE DRINKS ON ONE OCCASION?: NEVER

## 2023-10-06 SDOH — SOCIAL STABILITY: SOCIAL INSECURITY
WITHIN THE LAST YEAR, HAVE TO BEEN RAPED OR FORCED TO HAVE ANY KIND OF SEXUAL ACTIVITY BY YOUR PARTNER OR EX-PARTNER?: NO

## 2023-10-06 SDOH — SOCIAL STABILITY: SOCIAL INSECURITY: HAS ANYONE EVER THREATENED TO HURT YOUR FAMILY OR YOUR PETS?: NO

## 2023-10-06 SDOH — HEALTH STABILITY: MENTAL HEALTH
STRESS IS WHEN SOMEONE FEELS TENSE, NERVOUS, ANXIOUS, OR CAN'T SLEEP AT NIGHT BECAUSE THEIR MIND IS TROUBLED. HOW STRESSED ARE YOU?: ONLY A LITTLE

## 2023-10-06 SDOH — ECONOMIC STABILITY: FOOD INSECURITY: WITHIN THE PAST 12 MONTHS, YOU WORRIED THAT YOUR FOOD WOULD RUN OUT BEFORE YOU GOT MONEY TO BUY MORE.: NEVER TRUE

## 2023-10-06 SDOH — SOCIAL STABILITY: SOCIAL INSECURITY: WITHIN THE LAST YEAR, HAVE YOU BEEN AFRAID OF YOUR PARTNER OR EX-PARTNER?: NO

## 2023-10-06 SDOH — SOCIAL STABILITY: SOCIAL NETWORK
IN A TYPICAL WEEK, HOW MANY TIMES DO YOU TALK ON THE PHONE WITH FAMILY, FRIENDS, OR NEIGHBORS?: MORE THAN THREE TIMES A WEEK

## 2023-10-06 SDOH — ECONOMIC STABILITY: FOOD INSECURITY: WITHIN THE PAST 12 MONTHS, THE FOOD YOU BOUGHT JUST DIDN'T LAST AND YOU DIDN'T HAVE MONEY TO GET MORE.: NEVER TRUE

## 2023-10-06 SDOH — SOCIAL STABILITY: SOCIAL INSECURITY: WITHIN THE LAST YEAR, HAVE YOU BEEN HUMILIATED OR EMOTIONALLY ABUSED IN OTHER WAYS BY YOUR PARTNER OR EX-PARTNER?: NO

## 2023-10-06 SDOH — SOCIAL STABILITY: SOCIAL INSECURITY
WITHIN THE LAST YEAR, HAVE YOU BEEN KICKED, HIT, SLAPPED, OR OTHERWISE PHYSICALLY HURT BY YOUR PARTNER OR EX-PARTNER?: NO

## 2023-10-06 SDOH — SOCIAL STABILITY: SOCIAL NETWORK: HOW OFTEN DO YOU GET TOGETHER WITH FRIENDS OR RELATIVES?: MORE THAN THREE TIMES A WEEK

## 2023-10-06 SDOH — HEALTH STABILITY: MENTAL HEALTH: HOW OFTEN DO YOU HAVE A DRINK CONTAINING ALCOHOL?: NEVER

## 2023-10-06 SDOH — SOCIAL STABILITY: SOCIAL INSECURITY: WERE YOU ABLE TO COMPLETE ALL THE BEHAVIORAL HEALTH SCREENINGS?: YES

## 2023-10-06 SDOH — ECONOMIC STABILITY: TRANSPORTATION INSECURITY
IN THE PAST 12 MONTHS, HAS LACK OF TRANSPORTATION KEPT YOU FROM MEETINGS, WORK, OR FROM GETTING THINGS NEEDED FOR DAILY LIVING?: NO

## 2023-10-06 SDOH — SOCIAL STABILITY: SOCIAL INSECURITY: ABUSE: ADULT

## 2023-10-06 SDOH — ECONOMIC STABILITY: HOUSING INSECURITY
IN THE LAST 12 MONTHS, WAS THERE A TIME WHEN YOU DID NOT HAVE A STEADY PLACE TO SLEEP OR SLEPT IN A SHELTER (INCLUDING NOW)?: NO

## 2023-10-06 SDOH — SOCIAL STABILITY: SOCIAL INSECURITY: DO YOU FEEL ANYONE HAS EXPLOITED OR TAKEN ADVANTAGE OF YOU FINANCIALLY OR OF YOUR PERSONAL PROPERTY?: NO

## 2023-10-06 SDOH — HEALTH STABILITY: PHYSICAL HEALTH: ON AVERAGE, HOW MANY DAYS PER WEEK DO YOU ENGAGE IN MODERATE TO STRENUOUS EXERCISE (LIKE A BRISK WALK)?: 0 DAYS

## 2023-10-06 SDOH — ECONOMIC STABILITY: INCOME INSECURITY: IN THE LAST 12 MONTHS, WAS THERE A TIME WHEN YOU WERE NOT ABLE TO PAY THE MORTGAGE OR RENT ON TIME?: NO

## 2023-10-06 SDOH — SOCIAL STABILITY: SOCIAL INSECURITY: DOES ANYONE TRY TO KEEP YOU FROM HAVING/CONTACTING OTHER FRIENDS OR DOING THINGS OUTSIDE YOUR HOME?: NO

## 2023-10-06 SDOH — SOCIAL STABILITY: SOCIAL INSECURITY: HAVE YOU HAD THOUGHTS OF HARMING ANYONE ELSE?: NO

## 2023-10-06 SDOH — SOCIAL STABILITY: SOCIAL NETWORK: HOW OFTEN DO YOU ATTEND CHURCH OR RELIGIOUS SERVICES?: NEVER

## 2023-10-06 SDOH — HEALTH STABILITY: MENTAL HEALTH: HOW MANY STANDARD DRINKS CONTAINING ALCOHOL DO YOU HAVE ON A TYPICAL DAY?: PATIENT DOES NOT DRINK

## 2023-10-06 SDOH — HEALTH STABILITY: PHYSICAL HEALTH: ON AVERAGE, HOW MANY MINUTES DO YOU ENGAGE IN EXERCISE AT THIS LEVEL?: 0 MIN

## 2023-10-06 SDOH — ECONOMIC STABILITY: TRANSPORTATION INSECURITY
IN THE PAST 12 MONTHS, HAS THE LACK OF TRANSPORTATION KEPT YOU FROM MEDICAL APPOINTMENTS OR FROM GETTING MEDICATIONS?: NO

## 2023-10-06 SDOH — SOCIAL STABILITY: SOCIAL NETWORK: HOW OFTEN DO YOU ATTENT MEETINGS OF THE CLUB OR ORGANIZATION YOU BELONG TO?: NEVER

## 2023-10-06 SDOH — SOCIAL STABILITY: SOCIAL INSECURITY: DO YOU FEEL UNSAFE GOING BACK TO THE PLACE WHERE YOU ARE LIVING?: NO

## 2023-10-06 SDOH — SOCIAL STABILITY: SOCIAL INSECURITY: ARE THERE ANY APPARENT SIGNS OF INJURIES/BEHAVIORS THAT COULD BE RELATED TO ABUSE/NEGLECT?: NO

## 2023-10-06 ASSESSMENT — ACTIVITIES OF DAILY LIVING (ADL)
FEEDING YOURSELF: NEEDS ASSISTANCE
TOILETING: NEEDS ASSISTANCE
PATIENT'S MEMORY ADEQUATE TO SAFELY COMPLETE DAILY ACTIVITIES?: NO
HEARING - RIGHT EAR: FUNCTIONAL
DRESSING YOURSELF: NEEDS ASSISTANCE
ASSISTIVE_DEVICE: WALKER
GROOMING: NEEDS ASSISTANCE
BATHING: NEEDS ASSISTANCE
ADEQUATE_TO_COMPLETE_ADL: YES
JUDGMENT_ADEQUATE_SAFELY_COMPLETE_DAILY_ACTIVITIES: YES
HEARING - LEFT EAR: FUNCTIONAL

## 2023-10-06 ASSESSMENT — PAIN - FUNCTIONAL ASSESSMENT: PAIN_FUNCTIONAL_ASSESSMENT: 0-10

## 2023-10-06 ASSESSMENT — PATIENT HEALTH QUESTIONNAIRE - PHQ9
2. FEELING DOWN, DEPRESSED OR HOPELESS: NOT AT ALL
SUM OF ALL RESPONSES TO PHQ9 QUESTIONS 1 & 2: 0
1. LITTLE INTEREST OR PLEASURE IN DOING THINGS: NOT AT ALL

## 2023-10-06 ASSESSMENT — LIFESTYLE VARIABLES
AUDIT-C TOTAL SCORE: 0
PRESCIPTION_ABUSE_PAST_12_MONTHS: NO
HOW MANY STANDARD DRINKS CONTAINING ALCOHOL DO YOU HAVE ON A TYPICAL DAY: PATIENT DOES NOT DRINK
HOW OFTEN DO YOU HAVE 6 OR MORE DRINKS ON ONE OCCASION: NEVER
AUDIT-C TOTAL SCORE: 0
SUBSTANCE_ABUSE_PAST_12_MONTHS: NO
SKIP TO QUESTIONS 9-10: 1
HOW OFTEN DO YOU HAVE A DRINK CONTAINING ALCOHOL: NEVER
SKIP TO QUESTIONS 9-10: 1
AUDIT-C TOTAL SCORE: 0

## 2023-10-06 ASSESSMENT — COGNITIVE AND FUNCTIONAL STATUS - GENERAL
WALKING IN HOSPITAL ROOM: A LOT
TOILETING: A LITTLE
HELP NEEDED FOR BATHING: A LITTLE
MOVING TO AND FROM BED TO CHAIR: A LITTLE
DAILY ACTIVITIY SCORE: 17
MOVING FROM LYING ON BACK TO SITTING ON SIDE OF FLAT BED WITH BEDRAILS: A LITTLE
PERSONAL GROOMING: A LOT
DRESSING REGULAR UPPER BODY CLOTHING: A LITTLE
DRESSING REGULAR LOWER BODY CLOTHING: A LITTLE
TURNING FROM BACK TO SIDE WHILE IN FLAT BAD: A LITTLE
STANDING UP FROM CHAIR USING ARMS: A LITTLE
EATING MEALS: A LITTLE
MOBILITY SCORE: 18

## 2023-10-06 ASSESSMENT — PAIN SCALES - GENERAL: PAINLEVEL_OUTOF10: 0 - NO PAIN

## 2023-10-06 NOTE — ED PROVIDER NOTES
HPI   Chief Complaint   Patient presents with   • Abnormal Lab     Pt presents to ED for hypokalemia after having labs done with PCP. A/O x2-3 hx of dementia. Pt denies CP, palpitations, SOB, abd pain, nausea, anticoags.        83-year-old female with medical history pertinent for multiple myeloma, hypertension, hyperlipidemia presents to the emergency department following a lab result of a potassium of 2.9 at an outside facility when she went for lab work and medication refill today.  Pertinently patient states that she has been having episodes of diarrhea over the past week.  She last had diarrhea yesterday.  She is currently asymptomatic at this time.  She denies chest pain shortness of breath lightheadedness dizziness nausea vomiting diarrhea in the past 24 hours abdominal pain urinary discomfort.                        No data recorded                Patient History   Past Medical History:   Diagnosis Date   • Acute kidney failure, unspecified (CMS/[facility]) 09/12/2017    RAMESH (acute kidney injury)   • Allergic rhinitis, unspecified 02/01/2021    Allergic sinusitis   • Body mass index (BMI) 33.0-33.9, adult 03/12/2020    Body mass index (BMI) of 33.0 to 33.9 in adult   • Body mass index (BMI)30.0-30.9, adult 03/23/2021    Body mass index (BMI) of 30.0 to 30.9 in adult   • Encounter for immunization 10/19/2015    Flu vaccine need   • Encounter for immunization 11/17/2015    Need for 23-polyvalent pneumococcal polysaccharide vaccine   • Essential (primary) hypertension 05/09/2017    Resistant hypertension   • Obesity, unspecified 03/12/2020    Obesity (BMI 30.0-34.9)   • Other conditions influencing health status 09/17/2019    History of cough   • Other conditions influencing health status 11/28/2015    History of cough   • Otitis media, unspecified, left ear 12/14/2018    Acute left otitis media   • Personal history of other diseases of the circulatory system 09/05/2019    History of malignant hypertension   •  Personal history of other diseases of the circulatory system 07/07/2017    History of sinus tachycardia   • Personal history of other diseases of the respiratory system 12/15/2015    History of chronic sinusitis   • Personal history of other diseases of the respiratory system 03/26/2019    History of sore throat   • Personal history of other specified conditions 07/15/2019    History of abnormal mammogram   • Personal history of other specified conditions 06/20/2018    History of diarrhea   • Personal history of other specified conditions 09/22/2017    History of breast lump   • Prediabetes 02/15/2017    Borderline type 2 diabetes mellitus     Past Surgical History:   Procedure Laterality Date   • BREAST BIOPSY  07/11/2018    Biopsy Breast Percutaneous Needle Core     Family History   Problem Relation Name Age of Onset   • Hypertension Mother          Essential Hypertension   • Hypertension Father          Essential Hypertension   • Hypertension Sister          Essential Hypertension   • Other (Other) Sister          Benign Neoplasm     Social History     Tobacco Use   • Smoking status: Never     Passive exposure: Never   • Smokeless tobacco: Never   Substance Use Topics   • Alcohol use: Not Currently   • Drug use: Never       Physical Exam   ED Triage Vitals [10/05/23 1931]   Temp Heart Rate Resp BP   36.5 °C (97.7 °F) 57 16 165/75      SpO2 Temp Source Heart Rate Source Patient Position   98 % Oral -- --      BP Location FiO2 (%)     -- --       Physical Exam  Constitutional:       General: She is not in acute distress.     Appearance: She is not ill-appearing.   HENT:      Head: Normocephalic and atraumatic.      Mouth/Throat:      Mouth: Mucous membranes are moist.      Pharynx: Oropharynx is clear.   Eyes:      Extraocular Movements: Extraocular movements intact.      Pupils: Pupils are equal, round, and reactive to light.   Cardiovascular:      Rate and Rhythm: Regular rhythm. Bradycardia present.      Pulses:  Normal pulses.      Heart sounds: Normal heart sounds.   Pulmonary:      Effort: Pulmonary effort is normal.      Breath sounds: Normal breath sounds.   Abdominal:      General: Abdomen is flat. There is no distension.      Palpations: Abdomen is soft.      Tenderness: There is no abdominal tenderness. There is no guarding or rebound.   Musculoskeletal:      Cervical back: Normal range of motion.      Right lower leg: Edema present.      Left lower leg: Edema present.      Comments: Patient states edema is at baseline is nonpitting   Skin:     General: Skin is warm and dry.      Capillary Refill: Capillary refill takes less than 2 seconds.   Neurological:      General: No focal deficit present.      Mental Status: She is alert and oriented to person, place, and time.      Cranial Nerves: No cranial nerve deficit.      Motor: No weakness.      Gait: Gait normal.   Psychiatric:         Mood and Affect: Mood normal.         Behavior: Behavior normal.       ED Course & MDM   Diagnoses as of 10/06/23 0048   Acute kidney injury (CMS/[facility])   Hypomagnesemia   Hypokalemia       Medical Decision Making  83-year-old female presents to the emergency department for hypokalemia found at lab draw today.  She is hemodynamically stable and ambulatory upon arrival to the emergency department.  Her potassium was measured at 3.0 on arrival in triage.  She recently had a diarrheal illness which explains her hypokalemia and mild RAMESH.  She is asymptomatic at this time apart from bradycardia which is her baseline her vitals are unremarkable.  Her labs are additionally pertinent for magnesium of 1.2 will replete with 2 g, and a creatinine of 1.06 which is increased from her baseline which is .7-.6, LR fluid bolus administered.  60 g oral K-Phos given will recheck labs following 2 g magnesium administration.  Patient admitted to the observation service for reassessment and remeasurement of her electrolytes and kidney function in the  morning.    Amount and/or Complexity of Data Reviewed  Independent Historian: caregiver     Details: Caregiver at bedside provides supplemental history relating to the temporality of the patient's diarrhea.        Procedure  Procedures     Michael Sandoval MD  Resident  10/06/23 0048

## 2023-10-06 NOTE — PROGRESS NOTES
10/06/23 0803   Physical Activity   On average, how many days per week do you engage in moderate to strenuous exercise (like a brisk walk)? 0 days   On average, how many minutes do you engage in exercise at this level? 0 min   Financial Resource Strain   How hard is it for you to pay for the very basics like food, housing, medical care, and heating? Not hard   Housing Stability   In the last 12 months, was there a time when you were not able to pay the mortgage or rent on time? N   In the last 12 months, how many places have you lived? 1   In the last 12 months, was there a time when you did not have a steady place to sleep or slept in a shelter (including now)? N   Transportation Needs   In the past 12 months, has lack of transportation kept you from medical appointments or from getting medications? no   In the past 12 months, has lack of transportation kept you from meetings, work, or from getting things needed for daily living? No   Food Insecurity   Within the past 12 months, you worried that your food would run out before you got the money to buy more. Never true   Within the past 12 months, the food you bought just didn't last and you didn't have money to get more. Never true   Stress   Do you feel stress - tense, restless, nervous, or anxious, or unable to sleep at night because your mind is troubled all the time - these days? Only a littl   Social Connections   In a typical week, how many times do you talk on the phone with family, friends, or neighbors? More than 3   How often do you get together with friends or relatives? More than 3   How often do you attend Episcopalian or Pentecostalism services? Never   Do you belong to any clubs or organizations such as Ep[employer] groups, unions, fraternal or athletic groups, or school groups? Yes   How often do you attend meetings of the clubs or organizations you belong to? Never   Are you , , , , never , or living with a partner? Never  marrie   Intimate Partner Violence   Within the last year, have you been afraid of your partner or ex-partner? No   Within the last year, have you been humiliated or emotionally abused in other ways by your partner or ex-partner? No   Within the last year, have you been kicked, hit, slapped, or otherwise physically hurt by your partner or ex-partner? No   Within the last year, have you been raped or forced to have any kind of sexual activity by your partner or ex-partner? No   Alcohol Use   Q1: How often do you have a drink containing alcohol? Never   Q2: How many drinks containing alcohol do you have on a typical day when you are drinking? None   Q3: How often do you have six or more drinks on one occasion? Never   Utilities   In the past 12 months has the electric, gas, oil, or water company threatened to shut off services in your home? No

## 2023-10-06 NOTE — PROGRESS NOTES
10/06/23 0755   [facility] Disability Status   Are you deaf or do you have serious difficulty hearing? N   Are you blind or do you have serious difficulty seeing, even when wearing glasses? N   Because of a physical, mental, or emotional condition, do you have serious difficulty concentrating, remembering, or making decisions? (5 years old or older) Y   Do you have serious difficulty walking or climbing stairs? Y   Do you have serious difficulty dressing or bathing? N   Because of a physical, mental, or emotional condition, do you have serious difficulty doing errands alone such as visiting the doctor? Y

## 2023-10-06 NOTE — PROGRESS NOTES
"Rolando Mays is a 83 y.o. female on day 0 of admission presenting with RAMESH (acute kidney injury) (CMS/[facility]).    Subjective   Patient lying back in bed, NAD  She does reports that she is feeling better now than when she came in   Patient does endorse episodes of diarrhea x days, which sister confirms  Denies abdominal pain, n/v, denies fever or chills, denies cp sob       Objective     Physical Exam  Constitutional:       General: She is not in acute distress.     Appearance: She is not toxic-appearing.      Comments: Patient AxO to person and place  Some confusion with questioning    HENT:      Head: Normocephalic and atraumatic.      Right Ear: External ear normal.      Left Ear: External ear normal.      Mouth/Throat:      Mouth: Mucous membranes are moist.      Pharynx: No oropharyngeal exudate.   Eyes:      Extraocular Movements: Extraocular movements intact.      Conjunctiva/sclera: Conjunctivae normal.      Pupils: Pupils are equal, round, and reactive to light.   Cardiovascular:      Rate and Rhythm: Normal rate and regular rhythm.      Heart sounds: No murmur heard.  Pulmonary:      Effort: Pulmonary effort is normal.      Breath sounds: No wheezing or rales.   Abdominal:      General: Abdomen is flat.      Tenderness: There is no abdominal tenderness. There is no guarding or rebound.   Musculoskeletal:         General: Normal range of motion.      Cervical back: Normal range of motion.   Skin:     General: Skin is warm and dry.   Neurological:      General: No focal deficit present.   Psychiatric:         Mood and Affect: Mood normal.         Last Recorded Vitals  Blood pressure 133/59, pulse 55, temperature 36.9 °C (98.4 °F), temperature source Tympanic, resp. rate 18, height 1.626 m (5' 4.02\"), weight 74.8 kg (164 lb 14.5 oz), SpO2 98 %.  Intake/Output last 3 Shifts:  I/O last 3 completed shifts:  In: 550 (7.3 mL/kg) [I.V.:50 (0.7 mL/kg); IV Piggyback:500]  Out: - (0 mL/kg)   Weight: 74.8 kg "     Relevant Results              Scheduled medications  atorvastatin, 20 mg, oral, Nightly  dexAMETHasone, 20 mg, oral, Weekly  escitalopram, 20 mg, oral, Daily  [Held by provider] hydroCHLOROthiazide, 25 mg, oral, Daily  [Held by provider] metFORMIN, 500 mg, oral, Daily with breakfast  metoprolol succinate XL, 100 mg, oral, Daily  NIFEdipine ER, 30 mg, oral, Daily      Continuous medications     PRN medications  PRN medications: acetaminophen **OR** acetaminophen **OR** acetaminophen, melatonin, ondansetron ODT **OR** ondansetron  Results for orders placed or performed during the hospital encounter of 10/05/23 (from the past 24 hour(s))   Basic metabolic panel   Result Value Ref Range    Glucose 172 (H) 74 - 99 mg/dL    Sodium 140 136 - 145 mmol/L    Potassium 3.0 (L) 3.5 - 5.3 mmol/L    Chloride 100 98 - 107 mmol/L    Bicarbonate 29 21 - 32 mmol/L    Anion Gap 14 10 - 20 mmol/L    Urea Nitrogen 13 6 - 23 mg/dL    Creatinine 1.06 (H) 0.50 - 1.05 mg/dL    eGFR 53 (L) >60 mL/min/1.73m*2    Calcium 7.6 (L) 8.6 - 10.3 mg/dL   Magnesium   Result Value Ref Range    Magnesium 1.20 (L) 1.60 - 2.40 mg/dL   Basic metabolic panel   Result Value Ref Range    Glucose 109 (H) 74 - 99 mg/dL    Sodium 141 136 - 145 mmol/L    Potassium 3.4 (L) 3.5 - 5.3 mmol/L    Chloride 102 98 - 107 mmol/L    Bicarbonate 28 21 - 32 mmol/L    Anion Gap 14 10 - 20 mmol/L    Urea Nitrogen 12 6 - 23 mg/dL    Creatinine 0.72 0.50 - 1.05 mg/dL    eGFR 84 >60 mL/min/1.73m*2    Calcium 7.2 (L) 8.6 - 10.3 mg/dL   Magnesium   Result Value Ref Range    Magnesium 2.40 1.60 - 2.40 mg/dL   POCT GLUCOSE   Result Value Ref Range    POCT Glucose 144 (H) 74 - 99 mg/dL    No results found.    Scheduled medications  atorvastatin, 20 mg, oral, Nightly  dexAMETHasone, 20 mg, oral, Weekly  escitalopram, 20 mg, oral, Daily  [Held by provider] hydroCHLOROthiazide, 25 mg, oral, Daily  metFORMIN, 500 mg, oral, Daily with breakfast  metoprolol succinate XL, 100 mg, oral,  Daily  NIFEdipine ER, 30 mg, oral, Daily      Continuous medications     PRN medications  PRN medications: acetaminophen **OR** acetaminophen **OR** acetaminophen, melatonin, ondansetron ODT **OR** ondansetron  Results for orders placed or performed during the hospital encounter of 10/05/23 (from the past 24 hour(s))   Basic metabolic panel   Result Value Ref Range    Glucose 172 (H) 74 - 99 mg/dL    Sodium 140 136 - 145 mmol/L    Potassium 3.0 (L) 3.5 - 5.3 mmol/L    Chloride 100 98 - 107 mmol/L    Bicarbonate 29 21 - 32 mmol/L    Anion Gap 14 10 - 20 mmol/L    Urea Nitrogen 13 6 - 23 mg/dL    Creatinine 1.06 (H) 0.50 - 1.05 mg/dL    eGFR 53 (L) >60 mL/min/1.73m*2    Calcium 7.6 (L) 8.6 - 10.3 mg/dL   Magnesium   Result Value Ref Range    Magnesium 1.20 (L) 1.60 - 2.40 mg/dL   Basic metabolic panel   Result Value Ref Range    Glucose 109 (H) 74 - 99 mg/dL    Sodium 141 136 - 145 mmol/L    Potassium 3.4 (L) 3.5 - 5.3 mmol/L    Chloride 102 98 - 107 mmol/L    Bicarbonate 28 21 - 32 mmol/L    Anion Gap 14 10 - 20 mmol/L    Urea Nitrogen 12 6 - 23 mg/dL    Creatinine 0.72 0.50 - 1.05 mg/dL    eGFR 84 >60 mL/min/1.73m*2    Calcium 7.2 (L) 8.6 - 10.3 mg/dL   Magnesium   Result Value Ref Range    Magnesium 2.40 1.60 - 2.40 mg/dL   POCT GLUCOSE   Result Value Ref Range    POCT Glucose 144 (H) 74 - 99 mg/dL                   Assessment/Plan   Principal Problem:    RAMESH (acute kidney injury) (CMS/[facility])  Active Problems:    Hypokalemia  RAMESH, resolved  Hypokalemia/Hypomagnesemia, improved  Diarrhea  Hx multiple myeloma  -RAMESH and electrolyte derangement likely secondary to diarrhea which appears to be resolving, last BM yesterday  -Cr 1.06 --> 0.72 with IVF, now at baseline   -K 2.9 --> 3.4  -Mg 1.2 --> 2.4  -trend BMP, replete as needed  -Hold hydrochlorothiazide, metformin   -continue gentle IVF     HTN  Bradycardia, at baseline   -holding metoprolol iso of bradycardia     -likely decrease dose metoprolol at discharge   -c/w  nifedipine      Hx Depression  -c/w Lexapro     Hx Multiple Myeloma  -Dexamethasone on Thursdays, resume Revlimid at discharge     Hx T2 DM  -holding metformin iso of RAMESH    DVT prophylaxis   SCDs. ambulation     I spent 50 minutes in the professional and overall care of this patient.    Discharge:  Patient to be discharged when medically stable           I spent 50 minutes in the professional and overall care of this patient.      Christian Campbell PA-C

## 2023-10-06 NOTE — PROGRESS NOTES
Transitional Care Coordination Progress Note:  Plan per Medical/Surgical team: treatment of hypokalemia with IV fluids  Status:obs  Payor source: [employer]  Discharge disposition: Home with sister & private aide 6 days a week 6 hours a day  Potential Barriers: K 3.0  ADOD: 10/6/2023  TROY Lara RN, BSN Transitional Care Coordinator ED# [phone]      10/06/23 0755   Discharge Planning   Living Arrangements Other (Comment)  (sister)   Support Systems Family members;Friends/neighbors   Assistance Needed private aide 6 days a week 6 hours a day   Type of Residence Private residence   Number of Stairs to Enter Residence 0   Number of Stairs Within Residence 0  (apartment with elevator)   Do you have animals or pets at home? No   Home or Post Acute Services None   Patient expects to be discharged to: Home with sister & private aide 6 days a week 6 hours a day   Does the patient need discharge transport arranged? No

## 2023-10-06 NOTE — ED PROVIDER NOTES
Patient signed out pending admission for IV fluids and electrolyte replacement.  Patient remains hemodynamically stable.  Patient admitted for further management.     Carissa Rob DO  10/06/23 0353

## 2023-10-06 NOTE — PROGRESS NOTES
Home with sister & private aide 6 days a week 6 hours a day     10/06/23 0759   Current Planned Discharge Disposition   Current Planned Discharge Disposition Home

## 2023-10-06 NOTE — H&P
"History Of Present Illness  Rolando Mays is a 83 y.o. female presenting with hypokalemia and hypomagnesemia. Patient has outpatient blood work done which revealed a K of 2.9 and she was referred to the ED. The patient is alert to self, she is a poor/unreliable historian. The patient states that she has had diarrhea off and on for a couple of months. She lives with her sister. When asked if the patient takes Imodium at home for her diarrhea an stated \"yes\".     Past Medical History  She has a past medical history of Acute kidney failure, unspecified (CMS/[facility]) (09/12/2017), Allergic rhinitis, unspecified (02/01/2021), Body mass index (BMI) 33.0-33.9, adult (03/12/2020), Body mass index (BMI)30.0-30.9, adult (03/23/2021), Encounter for immunization (10/19/2015), Encounter for immunization (11/17/2015), Essential (primary) hypertension (05/09/2017), Obesity, unspecified (03/12/2020), Other conditions influencing health status (09/17/2019), Other conditions influencing health status (11/28/2015), Otitis media, unspecified, left ear (12/14/2018), Personal history of other diseases of the circulatory system (09/05/2019), Personal history of other diseases of the circulatory system (07/07/2017), Personal history of other diseases of the respiratory system (12/15/2015), Personal history of other diseases of the respiratory system (03/26/2019), Personal history of other specified conditions (07/15/2019), Personal history of other specified conditions (06/20/2018), Personal history of other specified conditions (09/22/2017), and Prediabetes (02/15/2017).    Surgical History  She has a past surgical history that includes Breast biopsy (07/11/2018).     Social History  She reports that she has never smoked. She has never been exposed to tobacco smoke. She has never used smokeless tobacco. She reports that she does not currently use alcohol. She reports that she does not use drugs.    Family History  Family History   Problem " "Relation Name Age of Onset    Hypertension Mother          Essential Hypertension    Hypertension Father          Essential Hypertension    Hypertension Sister          Essential Hypertension    Other (Other) Sister          Benign Neoplasm        Allergies  Patient has no known allergies.    Review of Systems     Physical Exam     Last Recorded Vitals  Blood pressure (!) 101/44, pulse 52, temperature 36.5 °C (97.7 °F), temperature source Oral, resp. rate 18, height 1.626 m (5' 4\"), weight 74.8 kg (165 lb), SpO2 100 %.    Relevant Results  Scheduled medications  atorvastatin, 20 mg, oral, Nightly  dexAMETHasone, 20 mg, oral, Weekly  escitalopram, 20 mg, oral, Daily  [Held by provider] hydroCHLOROthiazide, 25 mg, oral, Daily  metFORMIN, 500 mg, oral, Daily with breakfast  metoprolol succinate XL, 100 mg, oral, Daily  NIFEdipine ER, 30 mg, oral, Daily      Continuous medications     PRN medications  PRN medications: acetaminophen **OR** acetaminophen **OR** acetaminophen, melatonin, ondansetron ODT **OR** ondansetron   Results for orders placed or performed during the hospital encounter of 10/05/23 (from the past 24 hour(s))   Basic metabolic panel   Result Value Ref Range    Glucose 172 (H) 74 - 99 mg/dL    Sodium 140 136 - 145 mmol/L    Potassium 3.0 (L) 3.5 - 5.3 mmol/L    Chloride 100 98 - 107 mmol/L    Bicarbonate 29 21 - 32 mmol/L    Anion Gap 14 10 - 20 mmol/L    Urea Nitrogen 13 6 - 23 mg/dL    Creatinine 1.06 (H) 0.50 - 1.05 mg/dL    eGFR 53 (L) >60 mL/min/1.73m*2    Calcium 7.6 (L) 8.6 - 10.3 mg/dL   Magnesium   Result Value Ref Range    Magnesium 1.20 (L) 1.60 - 2.40 mg/dL     Assessment/Plan     Hypokalemia/Hypomagnesemia  Diarrhea  -diarrhea has resolved (last BM yesterday)  -K 2.9, replaced, Mag 1.2, Replaced  -AM labs ordered  -Hold home hydrochlorothiazide    Hx HTN  -c/w nifedipine and metop (hold for HR < 60)    Hx Depression  -c/w Lexpro    Hx Multiple Myeloma  -Dexamethasone on Thursdays, resume " Revlimid at discharge    Hx T2 DM  -c/w Metformin     DVT prophylaxis   SCDs. ambulation    I spent 50 minutes in the professional and overall care of this patient.      Christy Atkinson, KIERAN-CNP

## 2023-10-06 NOTE — CARE PLAN
Problem: Pain  Goal: My pain/discomfort is manageable  Outcome: Progressing     Problem: Safety  Goal: Patient will be injury free during hospitalization  Outcome: Progressing  Goal: I will remain free of falls  Outcome: Progressing     Problem: Daily Care  Goal: Daily care needs are met  Outcome: Progressing     Problem: Psychosocial Needs  Goal: Demonstrates ability to cope with hospitalization/illness  Outcome: Progressing  Goal: Collaborate with me, my family, and caregiver to identify my specific goals  Outcome: Progressing  Flowsheets (Taken 10/6/2023 6067)  Cultural Requests During Hospitalization: n/a  Spiritual Requests During Hospitalization: n/a     Problem: Discharge Barriers  Goal: My discharge needs are met  Outcome: Progressing   The patient's goals for the shift include      The clinical goals for the shift include      Over the shift, the patient did not make progress toward the following goals. Barriers to progression include . Recommendations to address these barriers include .

## 2023-10-07 VITALS
DIASTOLIC BLOOD PRESSURE: 58 MMHG | SYSTOLIC BLOOD PRESSURE: 144 MMHG | RESPIRATION RATE: 16 BRPM | TEMPERATURE: 97.9 F | HEART RATE: 54 BPM | BODY MASS INDEX: 28.15 KG/M2 | OXYGEN SATURATION: 100 % | HEIGHT: 64 IN | WEIGHT: 164.9 LBS

## 2023-10-07 PROBLEM — E87.6 HYPOKALEMIA: Status: RESOLVED | Noted: 2023-10-06 | Resolved: 2023-10-07

## 2023-10-07 PROBLEM — N17.9 AKI (ACUTE KIDNEY INJURY) (CMS-HCC): Status: RESOLVED | Noted: 2023-10-06 | Resolved: 2023-10-07

## 2023-10-07 LAB
ANION GAP SERPL CALC-SCNC: 12 MMOL/L (ref 10–20)
BUN SERPL-MCNC: 8 MG/DL (ref 6–23)
CALCIUM SERPL-MCNC: 6.7 MG/DL (ref 8.6–10.3)
CHLORIDE SERPL-SCNC: 105 MMOL/L (ref 98–107)
CO2 SERPL-SCNC: 25 MMOL/L (ref 21–32)
CREAT SERPL-MCNC: 0.48 MG/DL (ref 0.5–1.05)
ERYTHROCYTE [DISTWIDTH] IN BLOOD BY AUTOMATED COUNT: 17.4 % (ref 11.5–14.5)
GFR SERPL CREATININE-BSD FRML MDRD: >90 ML/MIN/1.73M*2
GLUCOSE BLD MANUAL STRIP-MCNC: 146 MG/DL (ref 74–99)
GLUCOSE SERPL-MCNC: 191 MG/DL (ref 74–99)
HCT VFR BLD AUTO: 31.5 % (ref 36–46)
HGB BLD-MCNC: 9.6 G/DL (ref 12–16)
MAGNESIUM SERPL-MCNC: 1.7 MG/DL (ref 1.6–2.4)
MCH RBC QN AUTO: 25.9 PG (ref 26–34)
MCHC RBC AUTO-ENTMCNC: 30.5 G/DL (ref 32–36)
MCV RBC AUTO: 85 FL (ref 80–100)
NRBC BLD-RTO: 0 /100 WBCS (ref 0–0)
PLATELET # BLD AUTO: 177 X10*3/UL (ref 150–450)
PMV BLD AUTO: 13.1 FL (ref 7.5–11.5)
POTASSIUM SERPL-SCNC: 3.9 MMOL/L (ref 3.5–5.3)
RBC # BLD AUTO: 3.7 X10*6/UL (ref 4–5.2)
SODIUM SERPL-SCNC: 138 MMOL/L (ref 136–145)
WBC # BLD AUTO: 4.1 X10*3/UL (ref 4.4–11.3)

## 2023-10-07 PROCEDURE — 99232 SBSQ HOSP IP/OBS MODERATE 35: CPT | Performed by: NURSE PRACTITIONER

## 2023-10-07 PROCEDURE — 2500000004 HC RX 250 GENERAL PHARMACY W/ HCPCS (ALT 636 FOR OP/ED): Performed by: NURSE PRACTITIONER

## 2023-10-07 PROCEDURE — 2500000001 HC RX 250 WO HCPCS SELF ADMINISTERED DRUGS (ALT 637 FOR MEDICARE OP): Performed by: NURSE PRACTITIONER

## 2023-10-07 PROCEDURE — 85027 COMPLETE CBC AUTOMATED: CPT | Performed by: PHYSICIAN ASSISTANT

## 2023-10-07 PROCEDURE — 82947 ASSAY GLUCOSE BLOOD QUANT: CPT | Mod: 59

## 2023-10-07 PROCEDURE — 2500000004 HC RX 250 GENERAL PHARMACY W/ HCPCS (ALT 636 FOR OP/ED): Performed by: PHYSICIAN ASSISTANT

## 2023-10-07 PROCEDURE — 83735 ASSAY OF MAGNESIUM: CPT | Performed by: NURSE PRACTITIONER

## 2023-10-07 PROCEDURE — 36415 COLL VENOUS BLD VENIPUNCTURE: CPT | Performed by: PHYSICIAN ASSISTANT

## 2023-10-07 PROCEDURE — 36415 COLL VENOUS BLD VENIPUNCTURE: CPT | Performed by: NURSE PRACTITIONER

## 2023-10-07 PROCEDURE — 2500000001 HC RX 250 WO HCPCS SELF ADMINISTERED DRUGS (ALT 637 FOR MEDICARE OP): Performed by: PHYSICIAN ASSISTANT

## 2023-10-07 PROCEDURE — G0378 HOSPITAL OBSERVATION PER HR: HCPCS

## 2023-10-07 PROCEDURE — 82374 ASSAY BLOOD CARBON DIOXIDE: CPT | Performed by: NURSE PRACTITIONER

## 2023-10-07 RX ORDER — LANOLIN ALCOHOL/MO/W.PET/CERES
400 CREAM (GRAM) TOPICAL DAILY
Status: DISCONTINUED | OUTPATIENT
Start: 2023-10-07 | End: 2023-10-07 | Stop reason: HOSPADM

## 2023-10-07 RX ORDER — LORAZEPAM 0.5 MG/1
0.5 TABLET ORAL 2 TIMES DAILY PRN
Status: DISCONTINUED | OUTPATIENT
Start: 2023-10-07 | End: 2023-10-07 | Stop reason: HOSPADM

## 2023-10-07 RX ORDER — POTASSIUM CHLORIDE 20 MEQ/1
20 TABLET, EXTENDED RELEASE ORAL DAILY
Qty: 5 TABLET | Refills: 0 | Status: SHIPPED | OUTPATIENT
Start: 2023-10-08 | End: 2023-10-07 | Stop reason: SDUPTHER

## 2023-10-07 RX ORDER — POTASSIUM CHLORIDE 20 MEQ/1
20 TABLET, EXTENDED RELEASE ORAL DAILY
Qty: 5 TABLET | Refills: 0 | Status: SHIPPED | OUTPATIENT
Start: 2023-10-08 | End: 2023-10-11 | Stop reason: SDUPTHER

## 2023-10-07 RX ORDER — LANOLIN ALCOHOL/MO/W.PET/CERES
400 CREAM (GRAM) TOPICAL DAILY
Qty: 5 TABLET | Refills: 0 | Status: SHIPPED | OUTPATIENT
Start: 2023-10-07 | End: 2023-10-07 | Stop reason: SDUPTHER

## 2023-10-07 RX ORDER — LANOLIN ALCOHOL/MO/W.PET/CERES
400 CREAM (GRAM) TOPICAL DAILY
Qty: 5 TABLET | Refills: 0 | Status: SHIPPED | OUTPATIENT
Start: 2023-10-07 | End: 2023-10-11 | Stop reason: SDUPTHER

## 2023-10-07 RX ADMIN — Medication 400 MG: at 09:16

## 2023-10-07 RX ADMIN — NIFEDIPINE 30 MG: 30 TABLET, FILM COATED, EXTENDED RELEASE ORAL at 08:30

## 2023-10-07 RX ADMIN — LORAZEPAM 0.5 MG: 0.5 TABLET ORAL at 09:21

## 2023-10-07 RX ADMIN — Medication 3 MG: at 01:10

## 2023-10-07 RX ADMIN — ESCITALOPRAM OXALATE 20 MG: 20 TABLET, FILM COATED ORAL at 08:31

## 2023-10-07 RX ADMIN — POTASSIUM CHLORIDE 40 MEQ: 1500 TABLET, EXTENDED RELEASE ORAL at 08:31

## 2023-10-07 ASSESSMENT — COGNITIVE AND FUNCTIONAL STATUS - GENERAL
HELP NEEDED FOR BATHING: A LITTLE
MOVING FROM LYING ON BACK TO SITTING ON SIDE OF FLAT BED WITH BEDRAILS: A LITTLE
TOILETING: A LITTLE
MOVING TO AND FROM BED TO CHAIR: A LITTLE
EATING MEALS: A LITTLE
MOBILITY SCORE: 18
PERSONAL GROOMING: A LOT
DRESSING REGULAR LOWER BODY CLOTHING: A LITTLE
DRESSING REGULAR UPPER BODY CLOTHING: A LITTLE
TURNING FROM BACK TO SIDE WHILE IN FLAT BAD: A LITTLE
WALKING IN HOSPITAL ROOM: A LOT
DAILY ACTIVITIY SCORE: 17
STANDING UP FROM CHAIR USING ARMS: A LITTLE

## 2023-10-07 ASSESSMENT — PAIN SCALES - GENERAL: PAINLEVEL_OUTOF10: 0 - NO PAIN

## 2023-10-07 ASSESSMENT — PAIN - FUNCTIONAL ASSESSMENT: PAIN_FUNCTIONAL_ASSESSMENT: 0-10

## 2023-10-07 NOTE — CARE PLAN
Problem: Pain  Goal: My pain/discomfort is manageable  Outcome: Progressing     Problem: Safety  Goal: Patient will be injury free during hospitalization  Outcome: Progressing  Goal: I will remain free of falls  Outcome: Progressing     Problem: Daily Care  Goal: Daily care needs are met  Outcome: Progressing     Problem: Psychosocial Needs  Goal: Demonstrates ability to cope with hospitalization/illness  Outcome: Progressing  Goal: Collaborate with me, my family, and caregiver to identify my specific goals  Outcome: Progressing     Problem: Discharge Barriers  Goal: My discharge needs are met  Outcome: Progressing   The patient's goals for the shift include      The clinical goals for the shift include safety will be maintain throughout the shift

## 2023-10-07 NOTE — DISCHARGE SUMMARY
Discharge Diagnosis  RAMESH (acute kidney injury) (CMS/[facility])  Hypokalemia  Hypomagnesemia     Issues Requiring Follow-Up      Discharge Meds     Your medication list        START taking these medications        Instructions Last Dose Given Next Dose Due   magnesium oxide 400 mg (241.3 mg magnesium) tablet  Commonly known as: Mag-Ox      Take 1 tablet (400 mg) by mouth once daily.       potassium chloride CR 20 mEq ER tablet  Commonly known as: Klor-Con M20  Start taking on: October 8, 2023      Take 1 tablet (20 mEq) by mouth once daily. Do not crush or chew. Do not start before October 8, 2023.              CHANGE how you take these medications        Instructions Last Dose Given Next Dose Due   lenalidomide 15 mg capsule  Commonly known as: Revlimid  What changed: Another medication with the same name was removed. Continue taking this medication, and follow the directions you see here.      Take one capsule by mouth once daily for 21 days, then 7 days off (28-day cycle). Take whole with water. Do not break, chew, or open capsules.       LORazepam 1 mg tablet  Commonly known as: Ativan  What changed:   how much to take  reasons to take this      Take 1 tablet (1 mg) by mouth 2 times a day as needed for anxiety.              CONTINUE taking these medications        Instructions Last Dose Given Next Dose Due   aspirin 81 mg EC tablet           atorvastatin 20 mg tablet  Commonly known as: Lipitor      TAKE 1 TABLET (20 MG) BY MOUTH ONCE DAILY AT BEDTIME.       dexAMETHasone 4 mg tablet  Commonly known as: Decadron           escitalopram 20 mg tablet  Commonly known as: Lexapro      TAKE 1 TABLET BY MOUTH EVERY DAY       metFORMIN 500 mg tablet  Commonly known as: Glucophage      TAKE 1 TABLET (500 MG) BY MOUTH ONCE DAILY WITH A MEAL.       metoprolol succinate  mg 24 hr tablet  Commonly known as: Toprol-XL      TAKE 1 TABLET BY MOUTH EVERY DAY       NIFEdipine ER 30 mg 24 hr tablet  Commonly known as: Adalat CC    "   TAKE 1 TABLET BY MOUTH AT BEDTIME AND TAKE AN ADDITIONAL TABLET IN THE MORNING IF NEEDED                 Where to Get Your Medications        These medications were sent to [facility]/pharmacy #[id number] - [place], [place] - [address] [facility]  [address] [address] [place] [address]      Phone: [phone]   magnesium oxide 400 mg (241.3 mg magnesium) tablet  potassium chloride CR 20 mEq ER tablet         Test Results Pending At Discharge  Pending Labs       Order Current Status    Extra Tubes In process    Extra Tubes In process    Lavender Top In process    SST TOP In process            Hospital Course  Rolando Mays is a 83 y.o. female presenting with hypokalemia and hypomagnesemia. Patient has outpatient blood work done which revealed a K of 2.9 and she was referred to the ED. The patient is alert to self, she is a poor/unreliable historian. The patient states that she has had diarrhea off and on for a couple of months. She lives with her sister. When asked if the patient takes Imodium at home for her diarrhea an stated \"yes\".       RAMESH, resolved  Hypokalemia/Hypomagnesemia, resolved  Diarrhea, resolved  -RAMESH and electrolyte derangement likely secondary to diarrhea  last BM yesterday  -Continue to hold hydrochlorothiazide  -script provided for Potassium and Magnesium supplementation x 5 days  -repeat outpatient labs in 5-7 days with PCP    HTN  Bradycardia, at baseline   -c/w nifedipine, metop     Hx Depression  -c/w Lexapro     Hx Multiple Myeloma  -Dexamethasone on Thursdays, resume Revlimid at discharge     Hx T2 DM  -resume metformin on discharge     Patient seen at bedside. Events from the last visit reviewed. Discussed with staff. Results of tests and investigations from last visit reviewed and discussed with patient/Family. Electronic chart on [facility] reviewed. Input / Recommendations  from consultants appreciated and reviewed and agreed with.    discharge summary and profile completed. medications " reviewed and discussed with patient and family.  scripts completed and signed.    total discharge time in excess of 30 minutes.       Pertinent Physical Exam At Time of Discharge  Physical Exam  Constitutional: NAD  Eyes: no icterus   ENMT: mucous membranes moist  Head/Neck: supple  Resp/thorax: CTA bilat, no cough, on RA  C/V: RRR, no murmurs  : no Redd  GI: S/ND/NT, + BS  M/S: no joint swelling  Extremities: no edema  Neurological: non-focal  Skin: Warm and dry    Outpatient Follow-Up  Future Appointments   Date Time Provider Department Center   10/12/2023  3:20 PM Arabella Correa MD [id number] [facility]   10/12/2023  4:00 PM [facility] 09 [id number]   11/20/2023  1:00 PM Mena Menjivar MD [id number] [facility]         Stefani Fong, APRN-CNP

## 2023-10-08 DIAGNOSIS — I10 BENIGN ESSENTIAL HYPERTENSION: ICD-10-CM

## 2023-10-09 ENCOUNTER — PATIENT OUTREACH (OUTPATIENT)
Dept: PRIMARY CARE | Facility: CLINIC | Age: 83
End: 2023-10-09
Payer: COMMERCIAL

## 2023-10-09 RX ORDER — NIFEDIPINE 30 MG/1
TABLET, FILM COATED, EXTENDED RELEASE ORAL
Qty: 180 TABLET | Refills: 0 | Status: SHIPPED | OUTPATIENT
Start: 2023-10-09

## 2023-10-09 NOTE — PROGRESS NOTES
Discharge Facility:[facility]  Discharge Diagnosis:Hypomagnesemia   Admission Date:10/6/23  Discharge Date: 10/7/23    PCP Appointment Date:10/11/23  Specialist Appointment Date:   Hospital Encounter and Summary: Linked   See discharge assessment below for further details  Engagement  Call Start Time: 1316 (10/9/2023  1:16 PM)    Medications  Medications reviewed with patient/caregiver?: Yes (10/9/2023  1:16 PM)  Is the patient having any side effects they believe may be caused by any medication additions or changes?: No (10/9/2023  1:16 PM)  Does the patient have all medications ordered at discharge?: Yes (10/9/2023  1:16 PM)  Prescription Comments: see med list, potassium 20 (10/9/2023  1:16 PM)    Appointments  Does the patient have a primary care provider?: Yes (10/9/2023  1:46 PM)  Care Management Interventions: Verified appointment date/time/provider; Advised patient to make appointment (10/11/23) (10/9/2023  1:46 PM)  Has the patient kept scheduled appointments due by today?: Yes (10/9/2023  1:46 PM)  Care Management Interventions: Advised patient to keep appointment (10/9/2023  1:46 PM)    Patient Teaching  Does the patient have access to their discharge instructions?: Yes (10/9/2023  1:46 PM)  Care Management Interventions: Reviewed instructions with patient (10/9/2023  1:46 PM)  What is the patient's perception of their health status since discharge?: Improving (10/9/2023  1:46 PM)  Is the patient/caregiver able to teach back the hierarchy of who to call/visit for symptoms/problems? PCP, Specialist, Home Health nurse, Urgent Care, ED, 911: Yes (10/9/2023  1:46 PM)

## 2023-10-11 ENCOUNTER — OFFICE VISIT (OUTPATIENT)
Dept: PRIMARY CARE | Facility: CLINIC | Age: 83
End: 2023-10-11
Payer: COMMERCIAL

## 2023-10-11 VITALS
SYSTOLIC BLOOD PRESSURE: 149 MMHG | DIASTOLIC BLOOD PRESSURE: 105 MMHG | HEIGHT: 64 IN | BODY MASS INDEX: 24.75 KG/M2 | OXYGEN SATURATION: 98 % | WEIGHT: 145 LBS | HEART RATE: 50 BPM

## 2023-10-11 DIAGNOSIS — F41.9 ANXIETY: ICD-10-CM

## 2023-10-11 DIAGNOSIS — E83.42 HYPOMAGNESEMIA: ICD-10-CM

## 2023-10-11 DIAGNOSIS — G31.84 MCI (MILD COGNITIVE IMPAIRMENT): ICD-10-CM

## 2023-10-11 DIAGNOSIS — E11.9 TYPE 2 DIABETES MELLITUS WITHOUT COMPLICATION, WITHOUT LONG-TERM CURRENT USE OF INSULIN (MULTI): ICD-10-CM

## 2023-10-11 DIAGNOSIS — I10 BENIGN ESSENTIAL HYPERTENSION: Primary | ICD-10-CM

## 2023-10-11 DIAGNOSIS — E87.6 HYPOKALEMIA: ICD-10-CM

## 2023-10-11 DIAGNOSIS — Z23 ENCOUNTER FOR IMMUNIZATION: ICD-10-CM

## 2023-10-11 PROBLEM — I16.1 HYPERTENSIVE EMERGENCY WITHOUT CONGESTIVE HEART FAILURE: Status: RESOLVED | Noted: 2023-09-09 | Resolved: 2023-10-11

## 2023-10-11 PROBLEM — E78.5 HYPERLIPIDEMIA: Status: RESOLVED | Noted: 2023-09-09 | Resolved: 2023-10-11

## 2023-10-11 PROBLEM — R79.89 LOW VITAMIN D LEVEL: Status: RESOLVED | Noted: 2023-02-02 | Resolved: 2023-10-11

## 2023-10-11 PROCEDURE — 83735 ASSAY OF MAGNESIUM: CPT

## 2023-10-11 PROCEDURE — 90662 IIV NO PRSV INCREASED AG IM: CPT | Performed by: FAMILY MEDICINE

## 2023-10-11 PROCEDURE — 1160F RVW MEDS BY RX/DR IN RCRD: CPT | Performed by: FAMILY MEDICINE

## 2023-10-11 PROCEDURE — G0008 ADMIN INFLUENZA VIRUS VAC: HCPCS | Performed by: FAMILY MEDICINE

## 2023-10-11 PROCEDURE — 1036F TOBACCO NON-USER: CPT | Performed by: FAMILY MEDICINE

## 2023-10-11 PROCEDURE — 99496 TRANSJ CARE MGMT HIGH F2F 7D: CPT | Performed by: FAMILY MEDICINE

## 2023-10-11 PROCEDURE — 3077F SYST BP >= 140 MM HG: CPT | Performed by: FAMILY MEDICINE

## 2023-10-11 PROCEDURE — 1159F MED LIST DOCD IN RCRD: CPT | Performed by: FAMILY MEDICINE

## 2023-10-11 PROCEDURE — 3080F DIAST BP >= 90 MM HG: CPT | Performed by: FAMILY MEDICINE

## 2023-10-11 PROCEDURE — 36415 COLL VENOUS BLD VENIPUNCTURE: CPT

## 2023-10-11 PROCEDURE — 1126F AMNT PAIN NOTED NONE PRSNT: CPT | Performed by: FAMILY MEDICINE

## 2023-10-11 PROCEDURE — 80069 RENAL FUNCTION PANEL: CPT

## 2023-10-11 RX ORDER — POTASSIUM CHLORIDE 20 MEQ/1
20 TABLET, EXTENDED RELEASE ORAL DAILY
Qty: 30 TABLET | Refills: 0 | Status: SHIPPED | OUTPATIENT
Start: 2023-10-11

## 2023-10-11 RX ORDER — MIRTAZAPINE 7.5 MG/1
7.5 TABLET, FILM COATED ORAL NIGHTLY
Qty: 30 TABLET | Refills: 2 | Status: SHIPPED | OUTPATIENT
Start: 2023-10-11 | End: 2024-01-09

## 2023-10-11 RX ORDER — LANOLIN ALCOHOL/MO/W.PET/CERES
400 CREAM (GRAM) TOPICAL DAILY
Qty: 30 TABLET | Refills: 0 | Status: SHIPPED | OUTPATIENT
Start: 2023-10-11

## 2023-10-11 ASSESSMENT — PATIENT HEALTH QUESTIONNAIRE - PHQ9
2. FEELING DOWN, DEPRESSED OR HOPELESS: NOT AT ALL
1. LITTLE INTEREST OR PLEASURE IN DOING THINGS: NOT AT ALL
SUM OF ALL RESPONSES TO PHQ9 QUESTIONS 1 AND 2: 0

## 2023-10-11 ASSESSMENT — ENCOUNTER SYMPTOMS
LOSS OF SENSATION IN FEET: 0
OCCASIONAL FEELINGS OF UNSTEADINESS: 0
DEPRESSION: 0

## 2023-10-11 NOTE — PROGRESS NOTES
Subjective   Patient ID: Rolando Mays is a 83 y.o. female who presents for Hospital Follow-up.    HPI She was discharged from the hospital 4 days ago. She was admitted for Hypokalemia and hypomagnesemia. She has been taking Supplements since then.     OARRS:  Mena Menjivar MD on 10/12/2023  8:09 PM  I have personally reviewed the OARRS report for Rolando Mays. I have considered the risks of abuse, dependence, addiction and diversion    Is the patient prescribed a combination of a benzodiazepine and opioid?  No    Last Urine Drug Screen / ordered today: No  Recent Results (from the past 8760 hour(s))   OPIATE/OPIOID/BENZO PRESCRIPTION COMPLIANCE    Collection Time: 02/24/23  1:14 PM   Result Value Ref Range    DRUG SCREEN COMMENT URINE SEE BELOW     Creatine, Urine 378.9 mg/dL    Amphetamine Screen, Urine PRESUMPTIVE NEGATIVE NEGATIVE    Barbiturate Screen, Urine PRESUMPTIVE NEGATIVE NEGATIVE    Cannabinoid Screen, Urine PRESUMPTIVE NEGATIVE NEGATIVE    Cocaine Screen, Urine PRESUMPTIVE NEGATIVE NEGATIVE    PCP Screen, Urine PRESUMPTIVE NEGATIVE NEGATIVE    7-Aminoclonazepam <25 Cutoff <25 ng/mL    Alpha-Hydroxyalprazolam <25 Cutoff <25 ng/mL    Alpha-Hydroxymidazolam <25 Cutoff <25 ng/mL    Alprazolam <25 Cutoff <25 ng/mL    Chlordiazepoxide <25 Cutoff <25 ng/mL    Clonazepam <25 Cutoff <25 ng/mL    Diazepam <25 Cutoff <25 ng/mL    Lorazepam 134 (A) Cutoff <25 ng/mL    Midazolam <25 Cutoff <25 ng/mL    Nordiazepam <25 Cutoff <25 ng/mL    Oxazepam <25 Cutoff <25 ng/mL    Temazepam <25 Cutoff <25 ng/mL    Zolpidem <25 Cutoff <25 ng/mL    Zolpidem Metabolite (ZCA) <25 Cutoff <25 ng/mL    6-Acetylmorphine <25 Cutoff <25 ng/mL    Codeine <50 Cutoff <50 ng/mL    Hydrocodone <25 Cutoff <25 ng/mL    Hydromorphone <25 Cutoff <25 ng/mL    Morphine Urine <50 Cutoff <50 ng/mL    Norhydrocodone <25 Cutoff <25 ng/mL    Noroxycodone <25 Cutoff <25 ng/mL    Oxycodone <25 Cutoff <25 ng/mL    Oxymorphone <25 Cutoff <25  "ng/mL    Tramadol <50 Cutoff <50 ng/mL    O-Desmethyltramadol <50 Cutoff <50 ng/mL    Fentanyl <2.5 Cutoff<2.5 ng/mL    Norfentanyl <2.5 Cutoff<2.5 ng/mL    METHADONE CONFIRMATION,URINE <25 Cutoff <25 ng/mL    EDDP <25 Cutoff <25 ng/mL     Results are as expected.         Controlled Substance Agreement:  Date of the Last Agreement: 03/16/2023  Reviewed Controlled Substance Agreement including but not limited to the benefits, risks, and alternatives to treatment with a Controlled Substance medication(s).    Benzodiazepines:  What is the patient's goal of therapy? Control of Anxiety  Is this being achieved with current treatment? Yes    AILYN-7:  No data recorded    Activities of Daily Living:   Is your overall impression that this patient is benefiting (symptom reduction outweighs side effects) from benzodiazepine therapy? Yes     1. Physical Functioning: Better  2. Family Relationship: Better  3. Social Relationship: Better  4. Mood: Better  5. Sleep Patterns: Better  6. Overall Function: Better    Review of Systems  Constitutional: No fever or chills  Cardiovascular: no chest pain, no palpitations and no syncope.   Respiratory: no cough, no shortness of breath during exertion and no shortness of breath at rest.   Gastrointestinal: no abdominal pain, no nausea and no vomiting.  Neuro: No Headache, no dizziness    Objective   BP (!) 149/105   Pulse 50   Ht 1.626 m (5' 4\")   Wt 65.8 kg (145 lb)   SpO2 98%   BMI 24.89 kg/m²     Physical Exam  Constitutional: Alert and in no acute distress. Well developed, well nourished  Head and Face: Head and face: Normal.    Cardiovascular: Heart rate and rhythm were normal, normal S1 and S2. No peripheral edema.   Pulmonary: No respiratory distress. Clear bilateral breath sounds.  Musculoskeletal: Gait and station: Normal. Muscle strength/tone: Normal.   Skin: Normal skin color and pigmentation, normal skin turgor, and no rash.    Psychiatric: Judgment and insight: Intact. Mood " and affect: Normal.    Lab Results   Component Value Date    WBC 4.1 (L) 10/07/2023    HGB 9.6 (L) 10/07/2023    HCT 31.5 (L) 10/07/2023     10/07/2023    CHOL 182 03/31/2022    TRIG 106 03/31/2022    HDL 62.1 03/31/2022    ALT 10 10/05/2023    AST 10 10/05/2023     10/11/2023    K 4.3 10/11/2023     10/11/2023    CREATININE 0.73 10/11/2023    BUN 12 10/11/2023    CO2 24 10/11/2023    TSH 0.12 (L) 08/21/2023    HGBA1C 7.2 (A) 08/21/2023       NM PET CT myeloma initial  Narrative: Interpreted By:  SALVADOR LAND MD and CARLOS MANUEL BOYER MD  MRN: [id number]  Patient Name: CORTES GRIJALVA     STUDY:  PET/CT MYELOMA INITIAL;  4/10/2023 10:00 am     INDICATION:  myeloma staging  D47.2: MGUS (monoclonal gammopathy of unknown  significance).     COMPARISON:  CT chest abdomen pelvis 03/06/2023     ACCESSION NUMBER(S):  [id number]     ORDERING CLINICIAN:  LORI CHAVIS     TECHNIQUE:  DIVISION OF NUCLEAR MEDICINE  POSITRON EMISSION TOMOGRAPHY (PET-CT)     The patient received an intravenous dose of 11.4 mCi of Fluorine-18  fluorodeoxyglucose (FDG). Positron emission tomographic (PET) images  from mid-thigh to skull base were then acquired after a one hour  delay. Also acquired was a contemporaneous low dose non-contrast CT  scan performed for attenuation correction of PET images and anatomic  localization.  The PET and CT images were digitally fused for  display.  All images were acquired on a combined PET-CT scanner unit.  Some areas of FDG accumulation may be described in standardized  uptake value (SUV) units.     CODING:  Initial Treatment Strategy (PI)     CALIBRATION:  Dose Injection-to-Scan Interval (mins): 61 min  Mediastinal bloodpool SUV (normal 1.5-2.5): 2.9  Blood glucose: 137 mg/dL     FINDINGS:  NECK:  Nonspecific hypermetabolic foci in the thyroid isthmus (max SUV 3.9)  and the left posterior thyroid lobe (max SUV 3.4).  No hypermetabolic cervical lymphadenopathy is present.     CHEST:  No  focal hypermetabolic lesion is seen in the lung parenchyma.  No evidence of hypermetabolic mediastinal, hilar or axillary  lymphadenopathy.     ABDOMEN AND PELVIS:  No abnormal hypermetabolic activity in the abdomen and pelvis.  No evidence of hypermetabolic lymphadenopathy.  Physiologic radiotracer uptake is present in the liver and spleen  with excretion into the bowel loops and the genitourinary tract.  Cholelithiasis is noted.  The uterus is enlarged with multiple  partially calcified uterine fibroids. There is an 8.0 cm photopenic  defect, compatible with a simple renal cyst arising from the right  kidney.     MUSCULOSKELETAL:  There is no focal hypermetabolic lesion to suggest osseous metastasis.  Degenerative changes are noted in the bilateral shoulders and the  left knee.     Impression: No definite evidence of an active myelomatous process.     Hypermetabolic foci in the thyroid isthmus and posterior thyroid  lobe. It should be noted that incidental foci within the thyroid may  be bowed in of the 30% of cases. Further evaluation is recommended  with dedicated thyroid ultrasound.     I personally reviewed the image(s) / study and agree with the  findings and interpretation as stated. This study was interpreted at  [facility].         Assessment/Plan   Diagnoses and all orders for this visit:  Benign essential hypertension  Type 2 diabetes mellitus without complication, without long-term current use of insulin (CMS/HCC)  Anxiety  -     mirtazapine (Remeron) 7.5 mg tablet; Take 1 tablet (7.5 mg) by mouth once daily at bedtime.  MCI (mild cognitive impairment)  -     mirtazapine (Remeron) 7.5 mg tablet; Take 1 tablet (7.5 mg) by mouth once daily at bedtime.  Hypokalemia  -     potassium chloride CR (Klor-Con M20) 20 mEq ER tablet; Take 1 tablet (20 mEq) by mouth once daily. Do not crush or chew.  -     Renal Function Panel; Future  Hypomagnesemia  -     magnesium oxide  (Mag-Ox) 400 mg (241.3 mg magnesium) tablet; Take 1 tablet (400 mg) by mouth once daily.  -     Magnesium; Future  Encounter for immunization  -     Flu vaccine, quadrivalent, high-dose, preservative free, age 65y+ (FLUZONE)        Dear Rolando Mays     It was my pleasure to take care of you today in the office. Below are the things we discussed today:    1. Dementia and Agitation, anxiety- Continue Lexparo, Use ativan as needed  I have personally reviewed the OARRS report for this patient. This report is scanned into the electronic medical record. I have considered the risks of abuse, dependence, addiction and diversion. I believe that it is clinically appropriate for this patient to be prescribed this medication.     Based on the above findings and the clinical response to the controlled substance medications and improvement of the activities of daily living, sleep, and work performance. We made this complex decision to continue this therapy in light of the evidence of the patient's responsibility in using these medications as prescribed for their condition.     I explained and discussed with the patient and stressed the importance of knowing the side effects and the addicting and habit forming nature of the dangerous drugs we are using to treat the symptoms.     2. Hypomagnesemia - Continue Magnesium supplements    3. Hypokalemia - Continue K Supplements    4. Sleep Issues - Start Mirtazipine at night. Do not take with ativan    5. HTN - Continue Nifedipine and metoprolol    6. DM - Continue Metformin           Follow up in 1 months    Your yearly Physical is due in: Aug 2024  When you call the office for your yearly Physical, please ask them to inform me to order your blood work, so that you can get the fasting blood work before your appointment and we can discuss the results at your physical.      Please call me if any questions arise from now until your next visit. I will call you after I am done seeing  patients. A Doctor is always available by phone when the office is closed. Please feel free to call for help with any problem that you feel shouldn't wait until the office re-opens.     Mena Menjivar MD

## 2023-10-12 ENCOUNTER — APPOINTMENT (OUTPATIENT)
Dept: HEMATOLOGY/ONCOLOGY | Facility: HOSPITAL | Age: 83
End: 2023-10-12
Payer: COMMERCIAL

## 2023-10-12 ENCOUNTER — INFUSION (OUTPATIENT)
Dept: HEMATOLOGY/ONCOLOGY | Facility: HOSPITAL | Age: 83
End: 2023-10-12
Payer: COMMERCIAL

## 2023-10-12 ENCOUNTER — OFFICE VISIT (OUTPATIENT)
Dept: HEMATOLOGY/ONCOLOGY | Facility: HOSPITAL | Age: 83
End: 2023-10-12
Payer: COMMERCIAL

## 2023-10-12 VITALS
DIASTOLIC BLOOD PRESSURE: 72 MMHG | OXYGEN SATURATION: 100 % | BODY MASS INDEX: 25.06 KG/M2 | HEART RATE: 48 BPM | TEMPERATURE: 97.5 F | RESPIRATION RATE: 16 BRPM | WEIGHT: 146 LBS | SYSTOLIC BLOOD PRESSURE: 157 MMHG

## 2023-10-12 DIAGNOSIS — C90.00 MULTIPLE MYELOMA NOT HAVING ACHIEVED REMISSION (MULTI): Primary | ICD-10-CM

## 2023-10-12 DIAGNOSIS — C90.00 MULTIPLE MYELOMA NOT HAVING ACHIEVED REMISSION (MULTI): ICD-10-CM

## 2023-10-12 LAB
ALBUMIN SERPL BCP-MCNC: 3.8 G/DL (ref 3.4–5)
ANION GAP SERPL CALC-SCNC: 17 MMOL/L (ref 10–20)
BUN SERPL-MCNC: 12 MG/DL (ref 6–23)
CALCIUM SERPL-MCNC: 8.3 MG/DL (ref 8.6–10.6)
CHLORIDE SERPL-SCNC: 105 MMOL/L (ref 98–107)
CO2 SERPL-SCNC: 24 MMOL/L (ref 21–32)
CREAT SERPL-MCNC: 0.73 MG/DL (ref 0.5–1.05)
GFR SERPL CREATININE-BSD FRML MDRD: 82 ML/MIN/1.73M*2
GLUCOSE SERPL-MCNC: 117 MG/DL (ref 74–99)
MAGNESIUM SERPL-MCNC: 1.78 MG/DL (ref 1.6–2.4)
PHOSPHATE SERPL-MCNC: 2.7 MG/DL (ref 2.5–4.9)
POTASSIUM SERPL-SCNC: 4.3 MMOL/L (ref 3.5–5.3)
SODIUM SERPL-SCNC: 142 MMOL/L (ref 136–145)

## 2023-10-12 PROCEDURE — 99214 OFFICE O/P EST MOD 30 MIN: CPT | Performed by: INTERNAL MEDICINE

## 2023-10-12 PROCEDURE — 1159F MED LIST DOCD IN RCRD: CPT | Performed by: INTERNAL MEDICINE

## 2023-10-12 PROCEDURE — 1160F RVW MEDS BY RX/DR IN RCRD: CPT | Performed by: INTERNAL MEDICINE

## 2023-10-12 PROCEDURE — 3077F SYST BP >= 140 MM HG: CPT | Performed by: INTERNAL MEDICINE

## 2023-10-12 PROCEDURE — 1126F AMNT PAIN NOTED NONE PRSNT: CPT | Performed by: INTERNAL MEDICINE

## 2023-10-12 PROCEDURE — 2500000004 HC RX 250 GENERAL PHARMACY W/ HCPCS (ALT 636 FOR OP/ED): Mod: JZ | Performed by: INTERNAL MEDICINE

## 2023-10-12 PROCEDURE — 96372 THER/PROPH/DIAG INJ SC/IM: CPT

## 2023-10-12 PROCEDURE — 1036F TOBACCO NON-USER: CPT | Performed by: INTERNAL MEDICINE

## 2023-10-12 PROCEDURE — 3078F DIAST BP <80 MM HG: CPT | Performed by: INTERNAL MEDICINE

## 2023-10-12 RX ORDER — CALCIUM CARBONATE 500(1250)
500 TABLET ORAL 2 TIMES DAILY
OUTPATIENT
Start: 2023-10-12

## 2023-10-12 RX ORDER — FAMOTIDINE 10 MG/ML
20 INJECTION INTRAVENOUS ONCE AS NEEDED
Status: CANCELLED | OUTPATIENT
Start: 2023-11-09

## 2023-10-12 RX ORDER — EPINEPHRINE 0.3 MG/.3ML
0.3 INJECTION SUBCUTANEOUS EVERY 5 MIN PRN
Status: CANCELLED | OUTPATIENT
Start: 2023-11-09

## 2023-10-12 RX ORDER — DIPHENHYDRAMINE HYDROCHLORIDE 50 MG/ML
50 INJECTION INTRAMUSCULAR; INTRAVENOUS AS NEEDED
Status: CANCELLED | OUTPATIENT
Start: 2023-11-09

## 2023-10-12 RX ORDER — ALBUTEROL SULFATE 0.83 MG/ML
3 SOLUTION RESPIRATORY (INHALATION) AS NEEDED
Status: CANCELLED | OUTPATIENT
Start: 2023-11-09

## 2023-10-12 RX ADMIN — DENOSUMAB 120 MG: 120 INJECTION SUBCUTANEOUS at 18:03

## 2023-10-12 ASSESSMENT — ENCOUNTER SYMPTOMS
DEPRESSION: 0
LOSS OF SENSATION IN FEET: 0
OCCASIONAL FEELINGS OF UNSTEADINESS: 1

## 2023-10-12 ASSESSMENT — PAIN SCALES - GENERAL: PAINLEVEL: 0-NO PAIN

## 2023-10-12 NOTE — PATIENT INSTRUCTIONS
Louvenia should start taking a calcium vitamin D combo pill, as long as the calcium portion is 600mg twice a day. This can be over the counter supplements.

## 2023-10-13 NOTE — PROGRESS NOTES
Chief Complaint: Multiple Myeloma   Interval History:    Patient with Multiple Myeloma, IgG lambda is here for routine follow up.     Patient presented with increased weakness, and poor oral intake/weight loss. LAB Work showed anemia, renal insufficiency. Further work up showed SPEP with3 M proteins, 0.1 g/dL(IgG kappa beta  region), 0.1 g/dL(free lambda LC) and 0.7 g/dL(IgG lambda).  Lambda(182)/kappa(5.13) ratio 35.4 CT c/a/p/ showed no significant abnormality.  She denies any SOB, CP, bone pain except arthritic pain. denies f/c/n/v/d.     3/23/23: here for follow up. Tolerated BMbx. no new c/o. No pain.  4/13/23: PET showed no abnormal skeletal lesions.  5/4/23: started on weekly dex and doing better. did not get Revlimid yet. BMS Assistance form filled out and submitted to . No new c/o  5/25/23: doing okay. finally Revlimid is approved.  6/22/23: received Revlimid ~ 2 weeks ago. Tolerating well. No c/o  7/20/23: nervous about   high blood pressor this morning(180/90). HR 50's. No other issues.  8/17/23: c/o anxiety, mental instability at times. weight loss(refuses to have weight checked here)  9/14/23: no new c/o. moving into an assistant living with her sister.  10/12/23: no c/o. Tolerating chemo well.      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, retired  lives with her younger sister.(1)     Physical Exam:      Constitutional: Well developed, awake/alert but very  anxious, alert and cooperative   Eyes: PERRL, EOMI, clear sclera   ENMT: mucous membranes moist, no apparent injury,  no lesions seen   Head/Neck: Neck supple, no apparent injury, thyroid  without  mass or tenderness, No JVD, trachea midline, no bruits   Respiratory/Thorax: Patent airways, CTAB, normal  breath sounds with good chest expansion, thorax symmetric   Cardiovascular: Regular, rate and rhythm, no murmurs,  2+ equal pulses of the extremities, normal S 1and S 2   Gastrointestinal: Nondistended, soft, non-tender,  no rebound tenderness or guarding, no masses palpable, no organomegaly, +BS, no bruits   Genitourinary: No Discharge, vesicles or other abnormalities   Musculoskeletal: ROM intact, no joint swelling, normal  strength   Extremities: normal extremities, no cyanosis edema,  contusions or wounds, no clubbing   Neurological: alert and oriented x3, intact senses,  motor, response and reflexes, normal strength   Breast: No masses, tenderness, no discharge or discoloration   Lymphatic: No significant lymphadenopathy   Psychological: Appropriate mood and behavior   Skin: Warm and dry, no lesions, no rashes     Pathology Results:     ·  Results     Surgical Pathology [Mar 20 2023 1:41PM] ([id number])     Specimens: LEFT ILIAC BONE MARROW ASPIRATE /LEFT ILIAC BONE MARROW BIOPSY /Received in formalin, labeled with the patient's name and hospital number/Received in B-plus fixative, labeled with the patient's name and hospital   FINAL DIAGNOSIS   A&B: BONE MARROW, ASPIRATE WITH CLOT AND CORE BIOPSY WITH TOUCH IMPRINT, LEFT   ILIAC:     -- HYPERCELLULAR BONE MARROW (40-50%) WITH NORMAL TRILINEAGE HEMATOPOIESIS WITH    APPROXIMATELY 70% LAMBDA+ PLASMA CELLS CONSISTENT WITH PLASMA CELL MYELOMA.     Assessment and Plan:   Assessment:    Patient with newly diagnosed multiple Myeloma is here for routine follow up.  Plan:    1. Multiple MYeloma IgG lambda(0.7 g/dL)  and small IgG  kappa(0.1 g/dL), b2m elevated(3.9), LDH normal  R-ISS stage II  Hgb ~10(normocytic). Cr now normalized. normal Alb, Ca++, free light chains L/K ratio 35.4  Bone marrow bx: 70 % involvement plasma cell lambda restricted. FISH 1q  gain(high risk)  - discussed possible treatment options with her close friends.   - CT/PET no skeletal lesions.  - started on Dex on 23 and finally on Relvimid 15 mg.(~23-) Again discussed ASA 81mg for VTE ppx.  - Xgeva x 1 dose first dose 23. Plan to give every 3 months x 2 then every 6 months.  - cont Rd.  - M protein 0.7>>0.5>0.4  - Ig>>785  - Lambda:182>67>40  - L/K ratio:36>63>23>14  - cont to monitor     2. Increased Bili without other LFT abnormalities. LDH normal  - check direct/indirect bili next visit.     3. Mental status change- anxious, but denies insomnia, headache, gait or visual disturbance  - may benefit from Xanax-family will further discuss with PCP.  - on Xanax-> doing much better    4. HypoCa++:Ca supp     RTC 4 weeks for MD visit and Myeloma labs.     Chemo regimen: Rd  - R:15 mg(23- present)  - d: 20 mg weekly(23- present)  - Xgeva every 3 months(23)

## 2023-10-14 LAB
ALBUMIN: 3.5 G/DL (ref 3.4–5)
ALPHA 1 GLOBULIN: 0.5 G/DL (ref 0.2–0.6)
ALPHA 2 GLOBULIN: 1.2 G/DL (ref 0.4–1.1)
BETA GLOBULIN: 0.8 G/DL (ref 0.5–1.2)
GAMMA GLOBULIN: 0.7 G/DL (ref 0.5–1.4)
IMMUNOFIXATION COMMENT: ABNORMAL
M-PROTEIN 1: 0.1 G/DL
M-PROTEIN 2: 0.3 G/DL
PATH REVIEW - SERUM IMMUNOFIXATION: ABNORMAL
PATH REVIEW-SERUM PROTEIN ELECTROPHORESIS: ABNORMAL
PROTEIN ELECTROPHORESIS COMMENT: ABNORMAL

## 2023-10-14 RX ORDER — DIPHENHYDRAMINE HYDROCHLORIDE 50 MG/ML
50 INJECTION INTRAMUSCULAR; INTRAVENOUS AS NEEDED
OUTPATIENT
Start: 2024-01-04

## 2023-10-14 RX ORDER — ALBUTEROL SULFATE 0.83 MG/ML
3 SOLUTION RESPIRATORY (INHALATION) AS NEEDED
OUTPATIENT
Start: 2024-01-04

## 2023-10-14 RX ORDER — FAMOTIDINE 10 MG/ML
20 INJECTION INTRAVENOUS ONCE AS NEEDED
OUTPATIENT
Start: 2024-01-04

## 2023-10-14 RX ORDER — EPINEPHRINE 0.3 MG/.3ML
0.3 INJECTION SUBCUTANEOUS EVERY 5 MIN PRN
OUTPATIENT
Start: 2024-01-04

## 2023-10-24 DIAGNOSIS — E11.9 TYPE 2 DIABETES MELLITUS WITHOUT COMPLICATION, WITHOUT LONG-TERM CURRENT USE OF INSULIN (MULTI): Primary | ICD-10-CM

## 2023-10-26 ENCOUNTER — PATIENT OUTREACH (OUTPATIENT)
Dept: PRIMARY CARE | Facility: CLINIC | Age: 83
End: 2023-10-26
Payer: COMMERCIAL

## 2023-10-30 ENCOUNTER — ONCOLOGY MEDICATION OUTREACH (OUTPATIENT)
Dept: HEMATOLOGY/ONCOLOGY | Facility: HOSPITAL | Age: 83
End: 2023-10-30

## 2023-10-30 ENCOUNTER — TELEMEDICINE (OUTPATIENT)
Dept: PHARMACY | Facility: HOSPITAL | Age: 83
End: 2023-10-30
Payer: COMMERCIAL

## 2023-10-30 DIAGNOSIS — C90.00 MULTIPLE MYELOMA NOT HAVING ACHIEVED REMISSION (MULTI): Primary | ICD-10-CM

## 2023-10-30 DIAGNOSIS — E11.9 TYPE 2 DIABETES MELLITUS WITHOUT COMPLICATION, WITHOUT LONG-TERM CURRENT USE OF INSULIN (MULTI): ICD-10-CM

## 2023-10-30 DIAGNOSIS — C90.00 MULTIPLE MYELOMA, REMISSION STATUS UNSPECIFIED (MULTI): ICD-10-CM

## 2023-10-30 RX ORDER — LENALIDOMIDE 15 MG/1
CAPSULE ORAL
Qty: 21 CAPSULE | Refills: 0 | Status: SHIPPED | OUTPATIENT
Start: 2023-10-30 | End: 2023-11-27 | Stop reason: SDUPTHER

## 2023-10-30 NOTE — Clinical Note
Hi Dr. Menjivar- I spoke with Nehal and Richelle ([facility] and financial POAs, respectively) today for a virtual post-hospital discharge pharmacy follow-up visit. Sounds like Joieroseneeta's agitation has worsened after moving into Assisted Living. Richelle/Nehal don't think the Ativan is lasting long enough. A1c looks great- could even consider discontinuing metformin in favor of fewer medications.

## 2023-10-30 NOTE — Clinical Note
Bhupinder Mendez I had a virtual post-discharge pharmacy visit today [phone]). Appears agitation has worsened this past week as patient has transitioned into Assisted Living. Nehal and Assisted Living concerned that this may not be sufficient for patient--patient may need around-the-clock care provided by a nursing home. Nehal concerned that Ativan is lasting only 4-5 hours. Patient must take BID every day.

## 2023-10-30 NOTE — PROGRESS NOTES
ONCOLOGY CLINICAL PHARMACY NOTE     Subjective  Rolando Mays is a 83 y.o. female with MM, here for patient outreach.        Treatment history  Treatment Details   Treatment goal [No plan goal]   Plan Name Venous Access Orders   Status Active   Start Date 9/30/2023   End Date Until discontinued   Provider Arabella Correa MD   Chemotherapy [No matching medication found in this treatment plan]     Treatment Details   Treatment goal [No plan goal]   Plan Name Denosumab (Xgeva), 28 Day Cycles   Status Active   Start Date 10/12/2023   End Date Until discontinued   Provider Arabella Correa MD   Chemotherapy denosumab (Xgeva) injection 120 mg, 120 mg, subcutaneous, Once, 1 of 1 cycle  Administration: 120 mg (10/12/2023)       Treatment Details   Treatment goal [No plan goal]   Plan Name Ixazomib, 28 Day Cycles - Maintenance   Status Active   Start Date 10/30/2023 (Planned)   End Date 9/2/2024 (Planned)   Provider Arabella Correa MD   Chemotherapy [No matching medication found in this treatment plan]        Objective  There were no vitals taken for this visit.  Lab Results   Component Value Date    WBC 4.1 (L) 10/07/2023    HGB 9.6 (L) 10/07/2023    HCT 31.5 (L) 10/07/2023    MCV 85 10/07/2023     10/07/2023      Lab Results   Component Value Date    GLUCOSE 117 (H) 10/11/2023    CALCIUM 8.3 (L) 10/11/2023     10/11/2023    K 4.3 10/11/2023    CO2 24 10/11/2023     10/11/2023    BUN 12 10/11/2023    CREATININE 0.73 10/11/2023     Lab Results   Component Value Date    ALT 10 10/05/2023    AST 10 10/05/2023    ALKPHOS 66 10/05/2023    BILITOT 2.1 (H) 10/05/2023       Allergies and Medications   No Known Allergies    Current Outpatient Medications:     aspirin 81 mg EC tablet, Take 1 tablet (81 mg) by mouth once daily., Disp: , Rfl:     atorvastatin (Lipitor) 20 mg tablet, TAKE 1 TABLET (20 MG) BY MOUTH ONCE DAILY AT BEDTIME., Disp: 90 tablet, Rfl: 0    dexAMETHasone (Decadron) 4 mg tablet, Take 5  tablets (20 mg) by mouth 1 (one) time per week.  TAKE 5 TABLETS BY MOUTH ONCE EVERY 7 DAYS ON FRIDAY, Disp: , Rfl:     escitalopram (Lexapro) 20 mg tablet, TAKE 1 TABLET BY MOUTH EVERY DAY, Disp: 90 tablet, Rfl: 0    lenalidomide (Revlimid) 15 mg capsule, Take one capsule by mouth once daily for 21 days, then 7 days off (28-day cycle)., Disp: 21 capsule, Rfl: 0    LORazepam (Ativan) 1 mg tablet, Take 1 tablet (1 mg) by mouth 2 times a day as needed for anxiety., Disp: 60 tablet, Rfl: 2    magnesium oxide (Mag-Ox) 400 mg (241.3 mg magnesium) tablet, Take 1 tablet (400 mg) by mouth once daily., Disp: 30 tablet, Rfl: 0    metFORMIN (Glucophage) 500 mg tablet, TAKE 1 TABLET (500 MG) BY MOUTH ONCE DAILY WITH A MEAL., Disp: 90 tablet, Rfl: 0    metoprolol succinate XL (Toprol-XL) 100 mg 24 hr tablet, TAKE 1 TABLET BY MOUTH EVERY DAY, Disp: 90 tablet, Rfl: 0    mirtazapine (Remeron) 7.5 mg tablet, Take 1 tablet (7.5 mg) by mouth once daily at bedtime., Disp: 30 tablet, Rfl: 2    NIFEdipine ER (NIFEdipine CC) 30 mg 24 hr tablet, TAKE 1 TABLET BY MOUTH AT BEDTIME AND TAKE AN ADDITIONAL TABLET IN THE MORNING IF NEEDED, Disp: 180 tablet, Rfl: 0    potassium chloride CR (Klor-Con M20) 20 mEq ER tablet, Take 1 tablet (20 mEq) by mouth once daily. Do not crush or chew., Disp: 30 tablet, Rfl: 0    Assessment and Plan  Rolando Mays is a 83 y.o. female with MM, to be treated with selinexor, lenalidomide.    Patient's caregiver, Nehal Trujillo, reached out by phone on 10/30/23 to inquire as to next refill for lenalidomide for patient. Informed Nehal it was refilled today, 10/30, and that [facility] would be reaching out to coordinate delivery. Patient still has adequate supply for current cycle. Nehal was understanding of plan, I provided her the direct contact for [facility], and she said she would follow up with me if she didn't hear anything from them.    Also, per Dr. Correa, on 10/30, patient is to start  selinexor as she can no longer tolerate dexamethasone. Treatment plan was entered, and I informed Dr. Correa I would coordinate delivery and fill with [facility]. Dr. Correa confirmed patient was to remain on Revlimid.     Plan was also communicated to Nehal who was understanding of and agreeable to plan.      Carrillo Daniel, PharmD

## 2023-10-30 NOTE — PATIENT INSTRUCTIONS
Nehal and Richelle,    Thank you for speaking to me about Ms. Marshall's medications today.     The following items were discussed during her appointment:  An A1c of 7.2% is perfectly acceptable given Rolando's advanced age. Therefore, I have no recommended change regarding her metformin. If anything, we could consider stopping her metformin to decrease her overall number of medications.   We discussed how long the Ativan is lasting for Ms. Marshall to decrease her agitation, and how her agitation has worsened after moving into assisted living this past week. I will pass along this information to Dr. Menjivar.     Thank you for allowing Clinical Pharmacy to participate in the care of Ms. Marshall!    Vera Andrade, PharmD  Clinical Pharmacist  [phone]

## 2023-10-30 NOTE — PROGRESS NOTES
ONCOLOGY CLINICAL PHARMACY NOTE     Subjective  Rolando Mays is a 83 y.o. female with MM, here for refill support.        Treatment history  Treatment Details   Treatment goal [No plan goal]   Plan Name Venous Access Orders   Status Active   Start Date 9/30/2023   End Date Until discontinued   Provider Arabella Correa MD   Chemotherapy [No matching medication found in this treatment plan]     Treatment Details   Treatment goal [No plan goal]   Plan Name Denosumab (Xgeva), 28 Day Cycles   Status Active   Start Date 10/12/2023   End Date Until discontinued   Provider Arabella Correa MD   Chemotherapy denosumab (Xgeva) injection 120 mg, 120 mg, subcutaneous, Once, 1 of 1 cycle  Administration: 120 mg (10/12/2023)          Objective  There were no vitals taken for this visit.  Lab Results   Component Value Date    WBC 4.1 (L) 10/07/2023    HGB 9.6 (L) 10/07/2023    HCT 31.5 (L) 10/07/2023    MCV 85 10/07/2023     10/07/2023      Lab Results   Component Value Date    GLUCOSE 117 (H) 10/11/2023    CALCIUM 8.3 (L) 10/11/2023     10/11/2023    K 4.3 10/11/2023    CO2 24 10/11/2023     10/11/2023    BUN 12 10/11/2023    CREATININE 0.73 10/11/2023     Lab Results   Component Value Date    ALT 10 10/05/2023    AST 10 10/05/2023    ALKPHOS 66 10/05/2023    BILITOT 2.1 (H) 10/05/2023       Allergies and Medications   No Known Allergies    Current Outpatient Medications:     aspirin 81 mg EC tablet, Take 1 tablet (81 mg) by mouth once daily., Disp: , Rfl:     atorvastatin (Lipitor) 20 mg tablet, TAKE 1 TABLET (20 MG) BY MOUTH ONCE DAILY AT BEDTIME., Disp: 90 tablet, Rfl: 0    dexAMETHasone (Decadron) 4 mg tablet, Take 5 tablets (20 mg) by mouth 1 (one) time per week.  TAKE 5 TABLETS BY MOUTH ONCE EVERY 7 DAYS ON FRIDAY, Disp: , Rfl:     escitalopram (Lexapro) 20 mg tablet, TAKE 1 TABLET BY MOUTH EVERY DAY, Disp: 90 tablet, Rfl: 0    lenalidomide (Revlimid) 15 mg capsule, Take one capsule by mouth once  daily for 21 days, then 7 days off (28-day cycle)., Disp: 21 capsule, Rfl: 0    LORazepam (Ativan) 1 mg tablet, Take 1 tablet (1 mg) by mouth 2 times a day as needed for anxiety., Disp: 60 tablet, Rfl: 2    magnesium oxide (Mag-Ox) 400 mg (241.3 mg magnesium) tablet, Take 1 tablet (400 mg) by mouth once daily., Disp: 30 tablet, Rfl: 0    metFORMIN (Glucophage) 500 mg tablet, TAKE 1 TABLET (500 MG) BY MOUTH ONCE DAILY WITH A MEAL., Disp: 90 tablet, Rfl: 0    metoprolol succinate XL (Toprol-XL) 100 mg 24 hr tablet, TAKE 1 TABLET BY MOUTH EVERY DAY, Disp: 90 tablet, Rfl: 0    mirtazapine (Remeron) 7.5 mg tablet, Take 1 tablet (7.5 mg) by mouth once daily at bedtime., Disp: 30 tablet, Rfl: 2    NIFEdipine ER (NIFEdipine CC) 30 mg 24 hr tablet, TAKE 1 TABLET BY MOUTH AT BEDTIME AND TAKE AN ADDITIONAL TABLET IN THE MORNING IF NEEDED, Disp: 180 tablet, Rfl: 0    potassium chloride CR (Klor-Con M20) 20 mEq ER tablet, Take 1 tablet (20 mEq) by mouth once daily. Do not crush or chew., Disp: 30 tablet, Rfl: 0    Assessment and Plan  Rolando Mays is a 83 y.o. female with MM, to be treated with lenalidomide.    Per Dr. Correa's authroization, on 10/30/23, I refilled Lenalidomide 15 mg QD x21 days, then 7 days off, for a 28 cycle. #21, 0 RF. Auth# ?[id number] obtained and prescription sent to [facility].      Patient is taking Aspirin 81 mg QD for thromboembolic prophylaxis.          Carrillo Daniel, PharmD

## 2023-10-30 NOTE — PROGRESS NOTES
Pharmacy Post-Discharge Visit  Rolando Mays is a 83 y.o. female who was referred to the Clinical Pharmacy Team to complete a post-discharge medication optimization and monitoring visit.  The patient was referred for their Diabetes.    Recent Hospitalization  Admission Date: 10/5/23  Discharge Date: 10/7/23  Admission Reason: hypokalemia s/p bloodwork completed via PCP  Discharge Diagnosis: RAMESH, hypokalema, hypomagnesemia    Referring Provider: Mena Menjivar MD    Subjective   No Known Allergies    Preferred Pharmacy    [id number] - [place], [place] - [address] AT [facility]  [address]  [facility] [address]  Phone: [phone] Fax: [phone]    [id number] - [place], [place] - [address] [facility]  [address] [facility] [address]  Phone: [phone] Fax: [phone]    [address] [address]  Phone: [phone] Fax: [phone]      Social History     Social History Narrative    Not on file        Notable Medication changes following discharge:  Start:   Magnesium oxide 400 mg, 1 tab QD  Hold:  Potassium Cl 2 mEq ER, 1 tab QD  Stop:   N/A  Change:   N/A    DIABETES ASSESSMENT     Current Diabetes Medication Regimen    Metformin  mg tab, 1 tab QD    Secondary Prevention  Statin? Yes, atorvastatin 20 mg once daily  ACE-I/ARB? No  Aspirin? Yes    Pertinent PMH Review:  PMH of Pancreatitis: No  PMH of Retinopathy: No  PMH of Urinary Tract Infections: Yes, possibly 4-5 months    Health Maintenance:   Foot Exam: Regularly sees podiatry  Eye Exam: Unsure  Lipid Panel: LDL 99 and triglyercides 106 as of 3/31/22    DIET: Patient has a decreased appetite  EXERCISE: Mostly sitting, does use a walker    Current monitoring regimen:   Patient is using: Based on A1c  Testing frequency: Not checking  Barriers to testing: N/A    Patient-Reported Blood Glucose:  N/A    Has the patient experienced  any low sugars since last contact? N/A  denies symptoms of low blood glucose: sweaty, shaky, anxious, excessive hunger, confusion, lightheaded, nauseous, blurred vision  Has the patient experienced any high sugars since last contact? N/A  denies symptoms of high blood glucose: excessive thirst, frequent urination, fatigue, nausea, shortness of breath, trouble concentrating    Diabetes Patient Education    PATIENT GOALS   Fasting B - 130 mg/dL 2-HR Postprandial BG:   Less than 180 mg/dL A1c:   Less than 8.0 %       Objective     There were no vitals taken for this visit.     Laboratory Results  Lab Results   Component Value Date    BILITOT 2.1 (H) 10/05/2023    CALCIUM 8.3 (L) 10/11/2023    CO2 24 10/11/2023     10/11/2023    CREATININE 0.73 10/11/2023    GLUCOSE 117 (H) 10/11/2023    ALKPHOS 66 10/05/2023    K 4.3 10/11/2023    PROT 6.7 10/05/2023    PROT 6.7 10/05/2023     10/11/2023    AST 10 10/05/2023    ALT 10 10/05/2023    BUN 12 10/11/2023    ANIONGAP 17 10/11/2023    MG 1.78 10/11/2023    PHOS 2.7 10/11/2023     10/05/2023    ALBUMIN 3.8 10/11/2023    GFRF 88 2023     Lab Results   Component Value Date    TRIG 106 2022    CHOL 182 2022    HDL 62.1 2022     Lab Results   Component Value Date    HGBA1C 7.2 (A) 2023       Medications  Current Outpatient Medications on File Prior to Visit   Medication Sig Dispense Refill    aspirin 81 mg EC tablet Take 1 tablet (81 mg) by mouth once daily.      atorvastatin (Lipitor) 20 mg tablet TAKE 1 TABLET (20 MG) BY MOUTH ONCE DAILY AT BEDTIME. 90 tablet 0    escitalopram (Lexapro) 20 mg tablet TAKE 1 TABLET BY MOUTH EVERY DAY 90 tablet 0    LORazepam (Ativan) 1 mg tablet Take 1 tablet (1 mg) by mouth 2 times a day as needed for anxiety. 60 tablet 2    magnesium oxide (Mag-Ox) 400 mg (241.3 mg magnesium) tablet Take 1 tablet (400 mg) by mouth once daily. 30 tablet 0    metFORMIN (Glucophage) 500 mg tablet TAKE 1  TABLET (500 MG) BY MOUTH ONCE DAILY WITH A MEAL. 90 tablet 0    metoprolol succinate XL (Toprol-XL) 100 mg 24 hr tablet TAKE 1 TABLET BY MOUTH EVERY DAY 90 tablet 0    mirtazapine (Remeron) 7.5 mg tablet Take 1 tablet (7.5 mg) by mouth once daily at bedtime. 30 tablet 2    NIFEdipine ER (NIFEdipine CC) 30 mg 24 hr tablet TAKE 1 TABLET BY MOUTH AT BEDTIME AND TAKE AN ADDITIONAL TABLET IN THE MORNING IF NEEDED 180 tablet 0    potassium chloride CR (Klor-Con M20) 20 mEq ER tablet Take 1 tablet (20 mEq) by mouth once daily. Do not crush or chew. 30 tablet 0    dexAMETHasone (Decadron) 4 mg tablet Take 5 tablets (20 mg) by mouth 1 (one) time per week.  TAKE 5 TABLETS BY MOUTH ONCE EVERY 7 DAYS ON FRIDAY      [DISCONTINUED] lenalidomide (Revlimid) 15 mg capsule Take one capsule by mouth once daily for 21 days, then 7 days off (28-day cycle). Take whole with water. Do not break, chew, or open capsules. 21 capsule 0     No current facility-administered medications on file prior to visit.        Historical Pharmacotherapy  Unknown    Drug Interactions  Dexamethasone (temporarily holding) can increase thrombogenic effects of lenalidomide.     Assessment/Plan   Problem List Items Addressed This Visit       Diabetes mellitus, type 2 (CMS/[facility])     General Patient Discussion  Spoke with Nehal Trujillo (medical POA) and Richelle Lim (financial POA)  Miscellaneous  Lorazepam  Dosing for agitation, only lasts 4-5 hours and not 'covering' all day despite BID dosing. Dosing does seem to help with symptoms, but wears off early.   Agitation: anxious, belligerent, displays change of behavior (threatening towards sister)  Moved into Assisted Living as of October 23rd, caregivers feel as though agitation has worsened   Caregivers/Assisted Living: May not be sufficient, possible that nursing home appropriate to facilitate around-the-clock care for patient  Staff at Assisted Living administers patient's daily medication  Revlimid- caregivers  concerned that next shipment has not arrived, this pharmacist provided [facility]'s phone number for caregivers to contact  Diabetes  A1c of 7.2% perfectly appropriate given patient's advanced age--could even consider discontinuing metformin altogether in favor of fewer daily medications  Discussed importance of maintaining low-carbohydrate diet    Plan/Changes   Continue all meds under the continuation of care with the referring provider and clinical pharmacy team  Specialists: Patient does not see an endocrinologist  Maintain current metformin  mg daily dosing or consider discontinuing altogether in favor of fewer daily medications.     Follow-Up  Not necessary at this time. Thank you for allowing Clinical Pharmacy to participate in caring for Ms. Marshall.      Vera Andrade, PharmD     Verbal consent to manage patient's drug therapy was obtained from the patient's Medical POA, Nehal Trujillo ([phone]). They were informed they may decline to participate or withdraw from participation in pharmacy services at any time.

## 2023-11-01 RX ORDER — ONDANSETRON 4 MG/1
TABLET, FILM COATED ORAL
Qty: 30 TABLET | Refills: 5 | Status: SHIPPED | OUTPATIENT
Start: 2023-11-01

## 2023-11-10 ENCOUNTER — PATIENT OUTREACH (OUTPATIENT)
Dept: PRIMARY CARE | Facility: CLINIC | Age: 83
End: 2023-11-10
Payer: COMMERCIAL

## 2023-11-14 ENCOUNTER — PHARMACY VISIT (OUTPATIENT)
Dept: PHARMACY | Facility: CLINIC | Age: 83
End: 2023-11-14
Payer: MEDICARE

## 2023-11-20 ENCOUNTER — APPOINTMENT (OUTPATIENT)
Dept: PRIMARY CARE | Facility: CLINIC | Age: 83
End: 2023-11-20
Payer: COMMERCIAL

## 2023-11-20 ENCOUNTER — TELEPHONE (OUTPATIENT)
Dept: ADMISSION | Facility: HOSPITAL | Age: 83
End: 2023-11-20

## 2023-11-20 DIAGNOSIS — C90.00 MULTIPLE MYELOMA NOT HAVING ACHIEVED REMISSION (MULTI): ICD-10-CM

## 2023-11-20 NOTE — TELEPHONE ENCOUNTER
Spoke with Nehal. She has a lot of questions regarding [place], including next appointment.  I told her that I will talk with Dr. Correa and see what she thinks is best regarding appointments and plan. Either Dr. Correa or I will give her a call tomorrow afternoon with more of a set plan. She is agreeable to this.

## 2023-11-20 NOTE — TELEPHONE ENCOUNTER
Nehal states she is patient's POA (she is not listed on her chart). She states she comes to every office visit with Rolando and would like to speak with the RN partner directly about patient and her sister, Adryan, as well. She states RN partner was helping coordinate appointments for both sisters.  Call transferred to Renata WHITTAKER

## 2023-11-21 ENCOUNTER — TELEMEDICINE (OUTPATIENT)
Dept: HEMATOLOGY/ONCOLOGY | Facility: CLINIC | Age: 83
End: 2023-11-21
Payer: COMMERCIAL

## 2023-11-21 DIAGNOSIS — C90.00 MULTIPLE MYELOMA NOT HAVING ACHIEVED REMISSION (MULTI): ICD-10-CM

## 2023-11-21 PROCEDURE — 99213 OFFICE O/P EST LOW 20 MIN: CPT | Performed by: INTERNAL MEDICINE

## 2023-11-27 ENCOUNTER — ONCOLOGY MEDICATION OUTREACH (OUTPATIENT)
Dept: HEMATOLOGY/ONCOLOGY | Facility: HOSPITAL | Age: 83
End: 2023-11-27
Payer: COMMERCIAL

## 2023-11-27 DIAGNOSIS — C90.00 MULTIPLE MYELOMA, REMISSION STATUS UNSPECIFIED (MULTI): ICD-10-CM

## 2023-11-27 RX ORDER — LENALIDOMIDE 15 MG/1
CAPSULE ORAL
Qty: 21 CAPSULE | Refills: 0 | Status: SHIPPED | OUTPATIENT
Start: 2023-11-27

## 2023-11-27 NOTE — PROGRESS NOTES
ONCOLOGY CLINICAL PHARMACY NOTE     Subjective  Rolando Mays is a 83 y.o. female with MM, here for refill support.        Treatment history  Treatment Details   Treatment goal [No plan goal]   Plan Name Venous Access Orders   Status Active   Start Date 9/30/2023   End Date Until discontinued   Provider Arabella Correa MD   Chemotherapy [No matching medication found in this treatment plan]     Treatment Details   Treatment goal [No plan goal]   Plan Name Denosumab (Xgeva), 28 Day Cycles   Status Active   Start Date 10/12/2023   End Date Until discontinued   Provider Arabella Correa MD   Chemotherapy denosumab (Xgeva) injection 120 mg, 120 mg, subcutaneous, Once, 1 of 1 cycle  Administration: 120 mg (10/12/2023)       Treatment Details   Treatment goal [No plan goal]   Plan Name Ixazomib, 28 Day Cycles - Maintenance   Status Active   Start Date 10/30/2023 (Planned)   End Date 9/2/2024 (Planned)   Provider Arabella Correa MD   Chemotherapy [No matching medication found in this treatment plan]        Objective  There were no vitals taken for this visit.  Lab Results   Component Value Date    WBC 4.1 (L) 10/07/2023    HGB 9.6 (L) 10/07/2023    HCT 31.5 (L) 10/07/2023    MCV 85 10/07/2023     10/07/2023      Lab Results   Component Value Date    GLUCOSE 117 (H) 10/11/2023    CALCIUM 8.3 (L) 10/11/2023     10/11/2023    K 4.3 10/11/2023    CO2 24 10/11/2023     10/11/2023    BUN 12 10/11/2023    CREATININE 0.73 10/11/2023     Lab Results   Component Value Date    ALT 10 10/05/2023    AST 10 10/05/2023    ALKPHOS 66 10/05/2023    BILITOT 2.1 (H) 10/05/2023       Allergies and Medications   No Known Allergies    Current Outpatient Medications:     aspirin 81 mg EC tablet, Take 1 tablet (81 mg) by mouth once daily., Disp: , Rfl:     atorvastatin (Lipitor) 20 mg tablet, TAKE 1 TABLET (20 MG) BY MOUTH ONCE DAILY AT BEDTIME., Disp: 90 tablet, Rfl: 0    dexAMETHasone (Decadron) 4 mg tablet, Take 5  tablets (20 mg) by mouth 1 (one) time per week.  TAKE 5 TABLETS BY MOUTH ONCE EVERY 7 DAYS ON FRIDAY, Disp: , Rfl:     escitalopram (Lexapro) 20 mg tablet, TAKE 1 TABLET BY MOUTH EVERY DAY, Disp: 90 tablet, Rfl: 0    ixazomib (Ninlaro) 3 mg capsule, Take one capsule (3 mg) by mouth once weekly for three weeks, the one week off. Take on Days 1, 8, and 15 of treatment cycles. Swallow whole., Disp: 3 capsule, Rfl: 3    lenalidomide (Revlimid) 15 mg capsule, Take one capsule by mouth once daily for 21 days, then 7 days off (28-day cycle)., Disp: 21 capsule, Rfl: 0    LORazepam (Ativan) 1 mg tablet, Take 1 tablet (1 mg) by mouth 2 times a day as needed for anxiety., Disp: 60 tablet, Rfl: 2    magnesium oxide (Mag-Ox) 400 mg (241.3 mg magnesium) tablet, Take 1 tablet (400 mg) by mouth once daily., Disp: 30 tablet, Rfl: 0    metFORMIN (Glucophage) 500 mg tablet, TAKE 1 TABLET (500 MG) BY MOUTH ONCE DAILY WITH A MEAL., Disp: 90 tablet, Rfl: 0    metoprolol succinate XL (Toprol-XL) 100 mg 24 hr tablet, TAKE 1 TABLET BY MOUTH EVERY DAY, Disp: 90 tablet, Rfl: 0    mirtazapine (Remeron) 7.5 mg tablet, Take 1 tablet (7.5 mg) by mouth once daily at bedtime., Disp: 30 tablet, Rfl: 2    NIFEdipine ER (NIFEdipine CC) 30 mg 24 hr tablet, TAKE 1 TABLET BY MOUTH AT BEDTIME AND TAKE AN ADDITIONAL TABLET IN THE MORNING IF NEEDED, Disp: 180 tablet, Rfl: 0    ondansetron (Zofran) 4 mg tablet, Take one tablet (4 mg) by mouth every 8 hours as needed for nausea/vomiting, Disp: 30 tablet, Rfl: 5    potassium chloride CR (Klor-Con M20) 20 mEq ER tablet, Take 1 tablet (20 mEq) by mouth once daily. Do not crush or chew., Disp: 30 tablet, Rfl: 0    Assessment and Plan  Rolando Mays is a 83 y.o. female with MM, to be treated with lenalidomide.    Per Dr. Correa's authroization, on 11/27/23, I refilled Lenalidomide 15 mg QD x21 days, then 7 days off, for a 28 cycle. #21, 0 RF. Auth# [id number] obtained and prescription sent to [facility].      Patient is taking Aspirin 81 mg QD for thromboembolic prophylaxis.        Carrillo Daniel, PharmD

## 2023-11-28 ENCOUNTER — TELEPHONE (OUTPATIENT)
Dept: ADMISSION | Facility: HOSPITAL | Age: 83
End: 2023-11-28
Payer: COMMERCIAL

## 2023-11-28 NOTE — TELEPHONE ENCOUNTER
MARLIN Hale called from the pt's SNF. She said the family brought in Joieuvneeta's Revlimid and said she was supposed to restart. I see the note from yesterday that a new Revlimid script was sent to [facility]. Alexia said to her understanding, the pt's Revlimid has been on hold so she wants to know the restart date. Message sent to the team to confirm because I do not see that it was on hold.

## 2023-11-28 NOTE — TELEPHONE ENCOUNTER
I tried to call Alexia CNP again, no answer. I left her a generic VM letting her know that the pt can restart Revlimid today. I left out the pt's name in the VM since her VM was not identifiable. I asked Alexia to call back for any questions/clarification.

## 2023-11-28 NOTE — TELEPHONE ENCOUNTER
Per Montez Daniel, [facility] Dr. Correa said she could restart Revlimid today. I tried to call the CNP back to make her aware, but she didn't answer and her VM was generic. I will try her again soon.

## 2023-11-29 ENCOUNTER — ANCILLARY PROCEDURE (OUTPATIENT)
Dept: RADIOLOGY | Facility: CLINIC | Age: 83
End: 2023-11-29
Payer: COMMERCIAL

## 2023-11-29 DIAGNOSIS — C90.00 MULTIPLE MYELOMA NOT HAVING ACHIEVED REMISSION (MULTI): Primary | ICD-10-CM

## 2023-11-29 PROCEDURE — 70470 CT HEAD/BRAIN W/O & W/DYE: CPT | Performed by: RADIOLOGY

## 2023-11-29 PROCEDURE — 70470 CT HEAD/BRAIN W/O & W/DYE: CPT

## 2023-11-29 PROCEDURE — 2550000001 HC RX 255 CONTRASTS: Performed by: INTERNAL MEDICINE

## 2023-11-29 RX ADMIN — IOHEXOL 50 ML: 350 INJECTION, SOLUTION INTRAVENOUS at 12:20

## 2023-12-01 ENCOUNTER — TELEPHONE (OUTPATIENT)
Dept: HEMATOLOGY/ONCOLOGY | Facility: HOSPITAL | Age: 83
End: 2023-12-01
Payer: COMMERCIAL

## 2023-12-06 NOTE — PROGRESS NOTES
Chief Complaint: Multiple Myeloma   Interval History:    Patient with Multiple Myeloma, IgG lambda is here for routine follow up.     Patient presented with increased weakness, and poor oral intake/weight loss. LAB Work showed anemia, renal insufficiency. Further work up showed SPEP with3 M proteins, 0.1 g/dL(IgG kappa beta  region), 0.1 g/dL(free lambda LC) and 0.7 g/dL(IgG lambda).  Lambda(182)/kappa(5.13) ratio 35.4 CT c/a/p/ showed no significant abnormality.  She denies any SOB, CP, bone pain except arthritic pain. denies f/c/n/v/d.     3/23/23: here for follow up. Tolerated BMbx. no new c/o. No pain.  4/13/23: PET showed no abnormal skeletal lesions.  5/4/23: started on weekly dex and doing better. did not get Revlimid yet. BMS Assistance form filled out and submitted to . No new c/o  5/25/23: doing okay. finally Revlimid is approved.  6/22/23: received Revlimid ~ 2 weeks ago. Tolerating well. No c/o  7/20/23: nervous about   high blood pressor this morning(180/90). HR 50's. No other issues.  8/17/23: c/o anxiety, mental instability at times. weight loss(refuses to have weight checked here)  9/14/23: no new c/o. moving into an assistant living with her sister.  10/12/23: no c/o. Tolerating chemo well.   11/21/23: she became more irritable often and combative toward others requiring sedatives. Unable to stay in assistant living. Head CT showed no acute changes.      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, retired  lives with her younger sister.(1)     Physical Exam:      Constitutional: Well developed, awake/alert but very  anxious, alert and cooperative    Eyes: PERRL, EOMI, clear sclera   ENMT: mucous membranes moist, no apparent injury,  no lesions seen   Head/Neck: Neck supple, no apparent injury, thyroid  without mass or tenderness, No JVD, trachea midline, no bruits   Respiratory/Thorax: Patent airways, CTAB, normal  breath sounds with good chest expansion, thorax symmetric   Cardiovascular: Regular, rate and rhythm, no murmurs,  2+ equal pulses of the extremities, normal S 1and S 2   Gastrointestinal: Nondistended, soft, non-tender,  no rebound tenderness or guarding, no masses palpable, no organomegaly, +BS, no bruits   Genitourinary: No Discharge, vesicles or other abnormalities   Musculoskeletal: ROM intact, no joint swelling, normal  strength   Extremities: normal extremities, no cyanosis edema,  contusions or wounds, no clubbing   Neurological: alert and oriented x3, intact senses,  motor, response and reflexes, normal strength   Breast: No masses, tenderness, no discharge or discoloration   Lymphatic: No significant lymphadenopathy   Psychological: Appropriate mood and behavior   Skin: Warm and dry, no lesions, no rashes     Pathology Results:     ·  Results     Surgical Pathology [Mar 20 2023 1:41PM] ([id number])     Specimens: LEFT ILIAC BONE MARROW ASPIRATE /LEFT ILIAC BONE MARROW BIOPSY /Received in formalin, labeled with the patient's name and hospital number/Received in B-plus fixative, labeled with the patient's name and hospital   FINAL DIAGNOSIS   A&B: BONE MARROW, ASPIRATE WITH CLOT AND CORE BIOPSY WITH TOUCH IMPRINT, LEFT   ILIAC:     -- HYPERCELLULAR BONE MARROW (40-50%) WITH NORMAL TRILINEAGE HEMATOPOIESIS WITH    APPROXIMATELY 70% LAMBDA+ PLASMA CELLS CONSISTENT WITH PLASMA CELL MYELOMA.     Assessment and Plan:   Assessment:    Patient with newly diagnosed multiple Myeloma is here for routine follow up.  Plan:    1. Multiple MYeloma IgG lambda(0.7 g/dL)  and small IgG  kappa(0.1 g/dL), b2m elevated(3.9), LDH normal  R-ISS stage  II  Hgb ~10(normocytic). Cr now normalized. normal Alb, Ca++, free light chains L/K ratio 35.4  Bone marrow bx: 70 % involvement plasma cell lambda restricted. FISH 1q gain(high risk)  - discussed possible treatment options with her close friends.   - CT/PET no skeletal lesions.  - started on Dex on 23 and finally on Relvimid 15 mg.(~23-) Again discussed ASA 81mg for VTE ppx.  - Xgeva x 1 dose first dose 23. Plan to give every 3 months x 2 then every 6 months.  - cont Rd.(We will resume revlimid which was held due to mental status change-> now it is clear AMS not due to Revlimid.  - M protein 0.7>>0.5>0.4  - Ig>>785  - Lambda:182>67>40  - L/K ratio:36>63>23>14  - cont to monitor     2. Increased Bili without other LFT abnormalities. LDH normal  - check direct/indirect bili next visit.     3. Mental status change- anxious, but denies insomnia, headache, gait or visual disturbance  - may benefit from Xanax-family will further discuss with PCP.  - on Xanax-> doing much better    4. HypoCa++:Ca supp     RTC 4 weeks for MD visit and Myeloma labs.     Chemo regimen: Rd  - R:15 mg(23- present)  - d: 20 mg weekly(23- present)  - Xgeva every 3 months(23)

## 2023-12-07 ENCOUNTER — OFFICE VISIT (OUTPATIENT)
Dept: HEMATOLOGY/ONCOLOGY | Facility: HOSPITAL | Age: 83
End: 2023-12-07
Payer: COMMERCIAL

## 2023-12-07 ENCOUNTER — LAB (OUTPATIENT)
Dept: LAB | Facility: HOSPITAL | Age: 83
End: 2023-12-07
Payer: COMMERCIAL

## 2023-12-07 VITALS
OXYGEN SATURATION: 100 % | SYSTOLIC BLOOD PRESSURE: 140 MMHG | HEART RATE: 64 BPM | RESPIRATION RATE: 18 BRPM | TEMPERATURE: 98.1 F | DIASTOLIC BLOOD PRESSURE: 81 MMHG

## 2023-12-07 DIAGNOSIS — C90.00 MULTIPLE MYELOMA NOT HAVING ACHIEVED REMISSION (MULTI): Primary | ICD-10-CM

## 2023-12-07 DIAGNOSIS — R41.82 ALTERED MENTAL STATUS, UNSPECIFIED ALTERED MENTAL STATUS TYPE: ICD-10-CM

## 2023-12-07 DIAGNOSIS — C90.00 MULTIPLE MYELOMA NOT HAVING ACHIEVED REMISSION (MULTI): ICD-10-CM

## 2023-12-07 LAB
ALBUMIN SERPL BCP-MCNC: 3.6 G/DL (ref 3.4–5)
ALP SERPL-CCNC: 76 U/L (ref 33–136)
ALT SERPL W P-5'-P-CCNC: 15 U/L (ref 7–45)
ANION GAP SERPL CALC-SCNC: 14 MMOL/L (ref 10–20)
AST SERPL W P-5'-P-CCNC: 16 U/L (ref 9–39)
BASOPHILS # BLD AUTO: 0.01 X10*3/UL (ref 0–0.1)
BASOPHILS NFR BLD AUTO: 0.1 %
BILIRUB SERPL-MCNC: 1.2 MG/DL (ref 0–1.2)
BUN SERPL-MCNC: 12 MG/DL (ref 6–23)
BURR CELLS BLD QL SMEAR: NORMAL
CALCIUM SERPL-MCNC: 8.6 MG/DL (ref 8.6–10.3)
CHLORIDE SERPL-SCNC: 104 MMOL/L (ref 98–107)
CO2 SERPL-SCNC: 27 MMOL/L (ref 21–32)
CREAT SERPL-MCNC: 0.77 MG/DL (ref 0.5–1.05)
DACRYOCYTES BLD QL SMEAR: NORMAL
EOSINOPHIL # BLD AUTO: 0.41 X10*3/UL (ref 0–0.4)
EOSINOPHIL NFR BLD AUTO: 5 %
ERYTHROCYTE [DISTWIDTH] IN BLOOD BY AUTOMATED COUNT: 17.4 % (ref 11.5–14.5)
GFR SERPL CREATININE-BSD FRML MDRD: 77 ML/MIN/1.73M*2
GLUCOSE SERPL-MCNC: 176 MG/DL (ref 74–99)
HBV CORE AB SER QL: NONREACTIVE
HBV SURFACE AB SER-ACNC: <3.1 MIU/ML
HBV SURFACE AG SERPL QL IA: NONREACTIVE
HCT VFR BLD AUTO: 33.2 % (ref 36–46)
HGB BLD-MCNC: 10.1 G/DL (ref 12–16)
IGA SERPL-MCNC: 67 MG/DL (ref 70–400)
IGG SERPL-MCNC: 1090 MG/DL (ref 700–1600)
IGM SERPL-MCNC: 20 MG/DL (ref 40–230)
IMM GRANULOCYTES # BLD AUTO: 0.09 X10*3/UL (ref 0–0.5)
IMM GRANULOCYTES NFR BLD AUTO: 1.1 % (ref 0–0.9)
LDH SERPL L TO P-CCNC: 248 U/L (ref 84–246)
LYMPHOCYTES # BLD AUTO: 1.78 X10*3/UL (ref 0.8–3)
LYMPHOCYTES NFR BLD AUTO: 21.7 %
MCH RBC QN AUTO: 27 PG (ref 26–34)
MCHC RBC AUTO-ENTMCNC: 30.4 G/DL (ref 32–36)
MCV RBC AUTO: 89 FL (ref 80–100)
MONOCYTES # BLD AUTO: 0.56 X10*3/UL (ref 0.05–0.8)
MONOCYTES NFR BLD AUTO: 6.8 %
NEUTROPHILS # BLD AUTO: 5.37 X10*3/UL (ref 1.6–5.5)
NEUTROPHILS NFR BLD AUTO: 65.3 %
NRBC BLD-RTO: 0 /100 WBCS (ref 0–0)
OVALOCYTES BLD QL SMEAR: NORMAL
PLATELET # BLD AUTO: 149 X10*3/UL (ref 150–450)
POLYCHROMASIA BLD QL SMEAR: NORMAL
POTASSIUM SERPL-SCNC: 3.6 MMOL/L (ref 3.5–5.3)
PROT SERPL-MCNC: 6.1 G/DL (ref 6.4–8.2)
PROT SERPL-MCNC: 6.3 G/DL (ref 6.4–8.2)
RBC # BLD AUTO: 3.74 X10*6/UL (ref 4–5.2)
RBC MORPH BLD: NORMAL
ROULEAUX BLD QL SMEAR: PRESENT
SCHISTOCYTES BLD QL SMEAR: NORMAL
SODIUM SERPL-SCNC: 141 MMOL/L (ref 136–145)
WBC # BLD AUTO: 8.2 X10*3/UL (ref 4.4–11.3)

## 2023-12-07 PROCEDURE — 3079F DIAST BP 80-89 MM HG: CPT | Performed by: INTERNAL MEDICINE

## 2023-12-07 PROCEDURE — 83615 LACTATE (LD) (LDH) ENZYME: CPT

## 2023-12-07 PROCEDURE — 82784 ASSAY IGA/IGD/IGG/IGM EACH: CPT

## 2023-12-07 PROCEDURE — 3077F SYST BP >= 140 MM HG: CPT | Performed by: INTERNAL MEDICINE

## 2023-12-07 PROCEDURE — 83521 IG LIGHT CHAINS FREE EACH: CPT

## 2023-12-07 PROCEDURE — 87340 HEPATITIS B SURFACE AG IA: CPT

## 2023-12-07 PROCEDURE — 84155 ASSAY OF PROTEIN SERUM: CPT

## 2023-12-07 PROCEDURE — 1126F AMNT PAIN NOTED NONE PRSNT: CPT | Performed by: INTERNAL MEDICINE

## 2023-12-07 PROCEDURE — 85025 COMPLETE CBC W/AUTO DIFF WBC: CPT

## 2023-12-07 PROCEDURE — 1159F MED LIST DOCD IN RCRD: CPT | Performed by: INTERNAL MEDICINE

## 2023-12-07 PROCEDURE — 1036F TOBACCO NON-USER: CPT | Performed by: INTERNAL MEDICINE

## 2023-12-07 PROCEDURE — 86704 HEP B CORE ANTIBODY TOTAL: CPT

## 2023-12-07 PROCEDURE — 86706 HEP B SURFACE ANTIBODY: CPT

## 2023-12-07 PROCEDURE — 86334 IMMUNOFIX E-PHORESIS SERUM: CPT

## 2023-12-07 PROCEDURE — 82947 ASSAY GLUCOSE BLOOD QUANT: CPT

## 2023-12-07 PROCEDURE — 1160F RVW MEDS BY RX/DR IN RCRD: CPT | Performed by: INTERNAL MEDICINE

## 2023-12-07 PROCEDURE — 84155 ASSAY OF PROTEIN SERUM: CPT | Mod: 59

## 2023-12-07 PROCEDURE — 36415 COLL VENOUS BLD VENIPUNCTURE: CPT

## 2023-12-07 PROCEDURE — 99215 OFFICE O/P EST HI 40 MIN: CPT | Performed by: INTERNAL MEDICINE

## 2023-12-07 PROCEDURE — 86320 SERUM IMMUNOELECTROPHORESIS: CPT | Performed by: PATHOLOGY

## 2023-12-07 PROCEDURE — 84165 PROTEIN E-PHORESIS SERUM: CPT | Performed by: PATHOLOGY

## 2023-12-07 ASSESSMENT — PAIN SCALES - GENERAL: PAINLEVEL: 0-NO PAIN

## 2023-12-07 NOTE — LETTER
December 7, 2023     Patient: Rolando Mays   YOB: 1940   Date of Visit: 12/7/2023       To Whom It May Concern:     Ms. Mays is going to discontinue the medication Revlimid.     If you have any questions or concerns, please don't hesitate to call [phone].         Sincerely,  Arabella Correa MD

## 2023-12-08 LAB
KAPPA LC SERPL-MCNC: 3.04 MG/DL (ref 0.33–1.94)
KAPPA LC/LAMBDA SER: 0.03 {RATIO} (ref 0.26–1.65)
LAMBDA LC SERPL-MCNC: 116.12 MG/DL (ref 0.57–2.63)

## 2023-12-09 NOTE — PROGRESS NOTES
Chief Complaint: Multiple Myeloma   Interval History:    Patient with Multiple Myeloma, IgG lambda is here for routine follow up.     Patient presented with increased weakness, and poor oral intake/weight loss. LAB Work showed anemia, renal insufficiency. Further work up showed SPEP with3 M proteins, 0.1 g/dL(IgG kappa beta  region), 0.1 g/dL(free lambda LC) and 0.7 g/dL(IgG lambda).  Lambda(182)/kappa(5.13) ratio 35.4 CT c/a/p/ showed no significant abnormality.  She denies any SOB, CP, bone pain except arthritic pain. denies f/c/n/v/d.     3/23/23: here for follow up. Tolerated BMbx. no new c/o. No pain.  4/13/23: PET showed no abnormal skeletal lesions.  5/4/23: started on weekly dex and doing better. did not get Revlimid yet. BMS Assistance form filled out and submitted to . No new c/o  5/25/23: doing okay. finally Revlimid is approved.  6/22/23: received Revlimid ~ 2 weeks ago. Tolerating well. No c/o  7/20/23: nervous about   high blood pressor this morning(180/90). HR 50's. No other issues.  8/17/23: c/o anxiety, mental instability at times. weight loss(refuses to have weight checked here)  9/14/23: no new c/o. moving into an assistant living with her sister.  10/12/23: no c/o. Tolerating chemo well.   11/21/23: she became more irritable often and combative toward others requiring sedatives. Unable to stay in assistant living. Head CT showed no acute changes.    12/7/23: No improvement in AMS. 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, retired  lives with her younger sister.(1)     Physical Exam:      Constitutional:  Well developed, awake/alert but very  anxious, alert and cooperative   Eyes: PERRL, EOMI, clear sclera   ENMT: mucous membranes moist, no apparent injury,  no lesions seen   Head/Neck: Neck supple, no apparent injury, thyroid  without mass or tenderness, No JVD, trachea midline, no bruits   Respiratory/Thorax: Patent airways, CTAB, normal  breath sounds with good chest expansion, thorax symmetric   Cardiovascular: Regular, rate and rhythm, no murmurs,  2+ equal pulses of the extremities, normal S 1and S 2   Gastrointestinal: Nondistended, soft, non-tender,  no rebound tenderness or guarding, no masses palpable, no organomegaly, +BS, no bruits   Genitourinary: No Discharge, vesicles or other abnormalities   Musculoskeletal: ROM intact, no joint swelling, normal  strength   Extremities: + 2 edema no cyanosis edema,  contusions or wounds, no clubbing   Neurological: alert and oriented x3, intact senses,  motor, response and reflexes, normal strength   Breast: No masses, tenderness, no discharge or discoloration   Lymphatic: No significant lymphadenopathy   Psychological: Appropriate mood and behavior   Skin: Warm and dry, no lesions, no rashes     Pathology Results:     ·  Results     Surgical Pathology [Mar 20 2023 1:41PM] ([id number])     Specimens: LEFT ILIAC BONE MARROW ASPIRATE /LEFT ILIAC BONE MARROW BIOPSY /Received in formalin, labeled with the patient's name and hospital number/Received in B-plus fixative, labeled with the patient's name and hospital   FINAL DIAGNOSIS   A&B: BONE MARROW, ASPIRATE WITH CLOT AND CORE BIOPSY WITH TOUCH IMPRINT, LEFT   ILIAC:     -- HYPERCELLULAR BONE MARROW (40-50%) WITH NORMAL TRILINEAGE HEMATOPOIESIS WITH    APPROXIMATELY 70% LAMBDA+ PLASMA CELLS CONSISTENT WITH PLASMA CELL MYELOMA.     Assessment and Plan:   Assessment:    Patient with newly diagnosed multiple Myeloma is here for routine follow up.  Plan:    1. Multiple MYeloma IgG lambda(0.7 g/dL)  and small IgG   kappa(0.1 g/dL), b2m elevated(3.9), LDH normal  R-ISS stage II  Hgb ~10(normocytic). Cr now normalized. normal Alb, Ca++, free light chains L/K ratio 35.4  Bone marrow bx: 70 % involvement plasma cell lambda restricted. FISH 1q gain(high risk)  - discussed possible treatment options with her close friends.   - CT/PET no skeletal lesions.  - started on Dex on 23 and finally on Relvimid 15 mg.(~23-) Again discussed ASA 81mg for VTE ppx.  - Xgeva x 1 dose first dose 23. Plan to give every 3 months x 2 then every 6 months.  - cont Rd.(We will resume revlimid which was held due to mental status change-> now it is clear AMS not due to Revlimid.  - M protein 0.7>>0.5>0.4  - Ig>>785  - Lambda:182>67>40>116  - L/K ratio:36>63>23>14  - discussed with family and health care proxy re; overall prognosis of Multiple Myeloma-> doubt recent AMS is all related to Multiple Myeloma progression.    Chemistry,CBC all unremarkable.   - recommended to focus on comfort care at this time. If AMS improves, return for further discussion and management. They are agreeable   2. Increased Bili without other LFT abnormalities. LDH normal  - check direct/indirect bili next visit.     3. Mental status change- anxious, but denies insomnia, headache, gait or visual disturbance  - became combative. Head CT reviewed. No acute changes.     4. HypoCa++:Ca supp     RTC PRN     Chemo regimen: Rd  - R:15 mg(23- present)  - d: 20 mg weekly(23- present)  - Xgeva every 3 months(23)

## 2023-12-11 LAB
ALBUMIN: 3.2 G/DL (ref 3.4–5)
ALPHA 1 GLOBULIN: 0.5 G/DL (ref 0.2–0.6)
ALPHA 2 GLOBULIN: 0.9 G/DL (ref 0.4–1.1)
BETA GLOBULIN: 0.8 G/DL (ref 0.5–1.2)
GAMMA GLOBULIN: 0.9 G/DL (ref 0.5–1.4)
IMMUNOFIXATION COMMENT: ABNORMAL
M-PROTEIN 1: 0.1 G/DL
M-PROTEIN 2: 0.4 G/DL
PATH REVIEW - SERUM IMMUNOFIXATION: ABNORMAL
PATH REVIEW-SERUM PROTEIN ELECTROPHORESIS: ABNORMAL
PROTEIN ELECTROPHORESIS COMMENT: ABNORMAL

## 2023-12-13 LAB
HOLD SPECIMEN: NORMAL
HOLD SPECIMEN: NORMAL

## 2023-12-21 DIAGNOSIS — I10 ESSENTIAL (PRIMARY) HYPERTENSION: ICD-10-CM

## 2023-12-21 RX ORDER — VALSARTAN 320 MG/1
320 TABLET ORAL DAILY
Qty: 90 TABLET | Refills: 0 | OUTPATIENT
Start: 2023-12-21

## 2024-01-08 DIAGNOSIS — I10 BENIGN ESSENTIAL HYPERTENSION: ICD-10-CM

## 2024-01-08 RX ORDER — NIFEDIPINE 30 MG/1
TABLET, FILM COATED, EXTENDED RELEASE ORAL
Qty: 180 TABLET | Refills: 0 | OUTPATIENT
Start: 2024-01-08

## 2024-01-10 ENCOUNTER — PATIENT OUTREACH (OUTPATIENT)
Dept: PRIMARY CARE | Facility: CLINIC | Age: 84
End: 2024-01-10
Payer: COMMERCIAL